# Patient Record
Sex: FEMALE | Race: WHITE | NOT HISPANIC OR LATINO | Employment: PART TIME | URBAN - NONMETROPOLITAN AREA
[De-identification: names, ages, dates, MRNs, and addresses within clinical notes are randomized per-mention and may not be internally consistent; named-entity substitution may affect disease eponyms.]

---

## 2017-01-06 ENCOUNTER — OFFICE VISIT (OUTPATIENT)
Dept: OBSTETRICS AND GYNECOLOGY | Facility: CLINIC | Age: 22
End: 2017-01-06

## 2017-01-06 VITALS
WEIGHT: 116 LBS | SYSTOLIC BLOOD PRESSURE: 98 MMHG | BODY MASS INDEX: 21.9 KG/M2 | DIASTOLIC BLOOD PRESSURE: 64 MMHG | HEIGHT: 61 IN

## 2017-01-06 DIAGNOSIS — N83.202 CYSTS OF BOTH OVARIES: Primary | ICD-10-CM

## 2017-01-06 DIAGNOSIS — N83.201 CYSTS OF BOTH OVARIES: Primary | ICD-10-CM

## 2017-01-06 PROCEDURE — 99212 OFFICE O/P EST SF 10 MIN: CPT | Performed by: NURSE PRACTITIONER

## 2017-01-06 RX ORDER — NICOTINE POLACRILEX 2 MG
1 GUM BUCCAL DAILY
COMMUNITY
End: 2019-10-08

## 2017-01-06 NOTE — PROGRESS NOTES
"Subjective   History of Present Illness    Alisa Coffey is a 21 y.o. female who presents for f/u after pelvic U/S for pelvic pain x 1 month that occurs about once a week. The pain is unilateral, sometimes on right, but also sometimes on left. Today reports no pain since last encounter. She still desires to have nexplanon insertion.      Current contraception: OCP (estrogen/progesterone)  Sexually active?: Yes  Postmenopausal?: No  HRT?: no  Hysterectomy?: no    Visit Vitals   • BP 98/64   • Ht 61\" (154.9 cm)   • Wt 116 lb (52.6 kg)   • LMP 2017   • Breastfeeding No   • BMI 21.92 kg/m2       Past Medical History   Diagnosis Date   • Abdominal pain    • Acute maxillary sinusitis, unspecified    • Allergic rhinitis due to pollen    • Biliary dyskinesia    • Chronic cholecystitis    • Common cold    • Contact dermatitis    • Contraception    • Diarrhea    • Encounter for administrative examinations    • Exanthematous disorder    • GERD (gastroesophageal reflux disease)    • Headache    • Influenza    • Keratoconjunctivitis    • Nausea and vomiting    • Need for immunization against influenza    • Other seasonal allergic rhinitis    • Pain in eye    • Purulent rhinitis    • Rash    • Right upper quadrant pain    • Urticaria        Past Surgical History   Procedure Laterality Date   • Cholecystectomy  10/01/2012     Cholecystectomy, laparoscopic (with intraoperative cholangiogram. Supervision & interpretation of intraoperative cholangiogram. Chronic cholecystitis. Biliary dyskinesia. Right upper quadrant pain)       The following portions of the patient's history were reviewed and updated as appropriate: allergies, current medications, past family history, past medical history, past social history, past surgical history and problem list.    Review of Systems    Constitutional:  No fatigue, no weight loss, no weight gain, no fever, no chills   Respiratory: No dyspnea, no cough, no hemoptysis, no " wheezing, no pleuritic pain   Cardiovascular: No chest pain, no palpitations, no arrhythmia, no orthopnea, no nocturnal dyspnea, no edema, no claudication   Breasts: No discharge from nipple, No breast tenderness and No breast mass   Gastrointestinal: No loss of appetite, No dysphagia, No abdominal pain, No nausea, No vomiting, No change in bowel habits, No diarrhea, No constipation and No blood in stool   Genitourinary: No increased frequency of urination, No dysuria, No hematuria, No nocturia, No urinary incontinence, No vaginal discharge, No abnormal vaginal bleeding, No pelvic pain, No menstrual problem and No menopausal problem   Skin: No skin rash, No skin lesion, No dry skin, No pruritus and No nail problem   Neurologic: No headache, No dizziness, No lightheadedness, No syncope, No vertigo, No weakness, No numbness, No tremor and No paresthesia   Psychiatric: No difficulty sleeping, No mood swings, No feeling anxious, No confusion and No memory loss          Objective   Physical Exam    General:  Alert and oriented x 3, Cooperative, Well developed & well nourished and No acute distress     FINDINGS- The uterus has a grossly normal appearance and appears  anteverted. There are no myometrial masses. Uterus measures 7 x 3.3 x  4.6[ cm. Endometrium appears striated and measures 10 mm in  thickness.       The right ovary has a normal sonographic appearance and measures 2.1  x 2.4 x 1.7 cm. Multiple follicles are visualized.      The left ovary has a normal sonographic appearance and measures 2.4 x  2.2 x 3.0 cm. Multiple follicles are visualized.      Color Doppler documents blood flow to both ovaries.      Small amount of pelvic fluid is present.      IMPRESSION- Normal pelvic ultrasound.    Assessment/Plan   Alisa was seen today for follow-up.    Diagnoses and all orders for this visit:    Cysts of both ovaries    All questions answered.  Discussed contraception methods, including risks/side  effects/benefits.  Contraception: OCP (estrogen/progesterone)  Diagnosis explained in detail, including differential.  Discussed pelvic pain. Went over GYN causes such as PID, ovarian torsion, ruptured ovarian cysts, ectopic pregnancy, endometriosis, adenomyosis, and uterine fibroids. Advised patient to use NSAIDs and heating pads for symptomatic treatment.  Follow up in 1 week. Nexplanon insertion next week

## 2017-01-06 NOTE — MR AVS SNAPSHOT
"                        Alisa Coffey   1/6/2017 10:00 AM   Office Visit    Dept Phone:  [phone]   Encounter #:  [id number]    Provider:  BENNY Corral   Department:  [facility]                Your Full Care Plan              Your Updated Medication List          This list is accurate as of: 1/6/17 10:31 AM.  Always use your most recent med list.                Biotin 1 MG capsule       omeprazole 20 MG capsule   Commonly known as:  PRILOSEC   Take 1 capsule by mouth Daily.       TRI-SPRINTEC 0.18/0.215/0.25 MG-35 MCG per tablet   Generic drug:  norgestimate-ethinyl estradiol               Instructions     None    Patient Instructions History      Upcoming Appointments     Visit Type Date Time Department    OFFICE VISIT 1/6/2017 10:00 AM CAREY GON[facility]    IN OFFICE PROCEDURE 1/13/2017  9:15 AM W ALTHEA GONZALEZ     1/13/2017  9:15 AM [facility] LISA      MyChart Signup     Our records indicate that you have declined [facility] sign[facility]. If you would like to sign up for [employer], please email [email or web address] or call [phone] to obtain an activation code.             Other Info from Your Visit           Your Appointments     Jan 13, 2017  9:15 AM CST   IN OFFICE PROCEDURE with BENNY Corral, PROCEDURE ROOM OBGYN [facility]   [facility] (--)    [address] [place] [address] Dr  [facility] [address] [place] [id number] [phone]           Bring medication list, test results, and radiology films that apply.              Allergies     No Known Allergies      Reason for Visit     Follow-up f/u to discuss U/S      Vital Signs     Blood Pressure Height Weight Last Menstrual Period Breastfeeding? Body Mass Index    98/64 61\" (154.9 cm) 116 lb (52.6 kg) 01/04/2017 No 21.92 kg/m2    Smoking Status                   Never Smoker             "

## 2017-01-13 ENCOUNTER — PROCEDURE VISIT (OUTPATIENT)
Dept: OBSTETRICS AND GYNECOLOGY | Facility: CLINIC | Age: 22
End: 2017-01-13

## 2017-01-13 DIAGNOSIS — Z30.46 SURVEILLANCE OF IMPLANTABLE SUBDERMAL CONTRACEPTIVE: Primary | ICD-10-CM

## 2017-01-13 PROCEDURE — 11981 INSERTION DRUG DLVR IMPLANT: CPT | Performed by: NURSE PRACTITIONER

## 2017-01-13 NOTE — PROGRESS NOTES
Nexplanon Insertion    Patient's last menstrual period was 01/04/2017.    Date of procedure:  1/13/2017    Risks and benefits discussed?  yes  All questions answered?  yes  Consents given by  the patient  Written consent obtained?  yes    Local anesthesia used:  yes - 2 cc's of  Meds; anesthesia local: 1% lidocaine    Procedure documentation:    The upper left arm (non-dominant) was marked at the intended site of insertion. The skin was cleansed with an antiseptic solution.  Local anesthesia was injected.  The Nexplanon was placed subdermally without difficulty.  The devise was able to be palpated in the arm by both myself and Alisa.  Steri-strips were then placed across the site of insertion and the arm was wrapped.    She tolerated the procedure well.  There were no complications.  EBL was minimal.    NDC# 0133-2421-45    Post procedure instructions: Remove the wrapping in 24 hours and the steri-strips in 5 days.    Follow up needed: PRN    Assessment/Plan:  Diagnoses and all orders for this visit:    Surveillance of implantable subdermal contraceptive      This note was electronically signed.    Renzo Bernardo, APRN  January 13, 2017

## 2017-01-13 NOTE — MR AVS SNAPSHOT
Alisa Coffey   1/13/2017 9:15 AM   Procedure visit    Dept Phone:  [phone]   Encounter #:  [id number]    Provider:  BENNY Corral; PROCEDURE ROOM OBMANPREET GONZALEZ   Department:  [facility]                Your Full Care Plan              Your Updated Medication List          This list is accurate as of: 1/13/17  9:48 AM.  Always use your most recent med list.                Biotin 1 MG capsule       omeprazole 20 MG capsule   Commonly known as:  PRILOSEC   Take 1 capsule by mouth Daily.       TRI-SPRINTEC 0.18/0.215/0.25 MG-35 MCG per tablet   Generic drug:  norgestimate-ethinyl estradiol               You Were Diagnosed With        Codes Comments    Surveillance of implantable subdermal contraceptive    -  Primary ICD-10-CM: Z30.46  ICD-9-CM: V25.43       Instructions     None    Patient Instructions History      Upcoming Appointments     Visit Type Date Time Department    IN OFFICE PROCEDURE 1/13/2017  9:15 AM [facility]     1/13/2017  9:15 AM [facility]    PAP SMEAR/PELVIC EXAM 7/20/2017  9:00 AM [facility]      [employer] Signup     Our records indicate that you have declined [facility] [employer]t signup. If you would like to sign up for [employer], please email [email or web address] or call [phone] to obtain an activation code.             Other Info from Your Visit           Your Appointments     Jul 20, 2017  9:00 AM CDT   Pap Smear/Pelvic Exam with BENNY Corral   [facility] (--)    [address] [place] [address] Dr  [facility] [address] [place] [id number] [phone]              Allergies     No Known Allergies      Vital Signs     Last Menstrual Period Smoking Status                01/04/2017 Never Smoker          Problems and Diagnoses Noted     Encounter for surveillance of implantable subdermal contraceptive    -  Primary

## 2017-01-17 ENCOUNTER — HOSPITAL ENCOUNTER (OUTPATIENT)
Dept: URGENT CARE | Facility: CLINIC | Age: 22
Discharge: HOME | End: 2017-01-17
Attending: FAMILY MEDICINE | Admitting: FAMILY MEDICINE

## 2017-12-14 RX ORDER — OSELTAMIVIR PHOSPHATE 75 MG/1
75 CAPSULE ORAL DAILY
Qty: 10 CAPSULE | Refills: 0 | Status: SHIPPED | OUTPATIENT
Start: 2017-12-14 | End: 2018-04-13

## 2018-03-31 ENCOUNTER — APPOINTMENT (OUTPATIENT)
Dept: GENERAL RADIOLOGY | Facility: HOSPITAL | Age: 23
End: 2018-03-31

## 2018-03-31 ENCOUNTER — HOSPITAL ENCOUNTER (EMERGENCY)
Facility: HOSPITAL | Age: 23
Discharge: HOME | End: 2018-03-31
Attending: EMERGENCY MEDICINE | Admitting: EMERGENCY MEDICINE

## 2018-03-31 VITALS
TEMPERATURE: 98.2 F | WEIGHT: 121.1 LBS | SYSTOLIC BLOOD PRESSURE: 120 MMHG | DIASTOLIC BLOOD PRESSURE: 75 MMHG | RESPIRATION RATE: 18 BRPM | OXYGEN SATURATION: 99 % | HEART RATE: 68 BPM | HEIGHT: 62 IN | BODY MASS INDEX: 22.28 KG/M2

## 2018-03-31 DIAGNOSIS — R00.2 PALPITATIONS: ICD-10-CM

## 2018-03-31 DIAGNOSIS — F41.9 ANXIETY: Primary | ICD-10-CM

## 2018-03-31 LAB
ALBUMIN SERPL-MCNC: 4.6 G/DL (ref 3.4–4.8)
ALBUMIN/GLOB SERPL: 1.3 G/DL (ref 1.1–1.8)
ALP SERPL-CCNC: 71 U/L (ref 38–126)
ALT SERPL W P-5'-P-CCNC: 22 U/L (ref 9–52)
ANION GAP SERPL CALCULATED.3IONS-SCNC: 14 MMOL/L (ref 5–15)
AST SERPL-CCNC: 25 U/L (ref 14–36)
B-HCG UR QL: NEGATIVE
BASOPHILS # BLD AUTO: 0.03 10*3/MM3 (ref 0–0.2)
BASOPHILS NFR BLD AUTO: 0.4 % (ref 0–2)
BILIRUB SERPL-MCNC: 0.6 MG/DL (ref 0.2–1.3)
BILIRUB UR QL STRIP: NEGATIVE
BUN BLD-MCNC: 8 MG/DL (ref 7–21)
BUN/CREAT SERPL: 12.3 (ref 7–25)
CALCIUM SPEC-SCNC: 9.7 MG/DL (ref 8.4–10.2)
CHLORIDE SERPL-SCNC: 102 MMOL/L (ref 95–110)
CLARITY UR: CLEAR
CO2 SERPL-SCNC: 23 MMOL/L (ref 22–31)
COLOR UR: YELLOW
CREAT BLD-MCNC: 0.65 MG/DL (ref 0.5–1)
D-DIMER, QUANTITATIVE (MAD,POW, STR): <270 NG/ML (FEU) (ref 0–470)
DEPRECATED RDW RBC AUTO: 40.8 FL (ref 36.4–46.3)
EOSINOPHIL # BLD AUTO: 0.09 10*3/MM3 (ref 0–0.7)
EOSINOPHIL NFR BLD AUTO: 1.1 % (ref 0–7)
ERYTHROCYTE [DISTWIDTH] IN BLOOD BY AUTOMATED COUNT: 11.8 % (ref 11.5–14.5)
GFR SERPL CREATININE-BSD FRML MDRD: 115 ML/MIN/1.73 (ref 71–165)
GLOBULIN UR ELPH-MCNC: 3.5 GM/DL (ref 2.3–3.5)
GLUCOSE BLD-MCNC: 99 MG/DL (ref 60–100)
GLUCOSE UR STRIP-MCNC: NEGATIVE MG/DL
HCT VFR BLD AUTO: 40.9 % (ref 35–45)
HGB BLD-MCNC: 13.9 G/DL (ref 12–15.5)
HGB UR QL STRIP.AUTO: NEGATIVE
HOLD SPECIMEN: NORMAL
IMM GRANULOCYTES # BLD: 0.02 10*3/MM3 (ref 0–0.02)
IMM GRANULOCYTES NFR BLD: 0.2 % (ref 0–0.5)
KETONES UR QL STRIP: NEGATIVE
LEUKOCYTE ESTERASE UR QL STRIP.AUTO: NEGATIVE
LYMPHOCYTES # BLD AUTO: 1.72 10*3/MM3 (ref 0.6–4.2)
LYMPHOCYTES NFR BLD AUTO: 20.8 % (ref 10–50)
MCH RBC QN AUTO: 32.3 PG (ref 26.5–34)
MCHC RBC AUTO-ENTMCNC: 34 G/DL (ref 31.4–36)
MCV RBC AUTO: 94.9 FL (ref 80–98)
MONOCYTES # BLD AUTO: 0.44 10*3/MM3 (ref 0–0.9)
MONOCYTES NFR BLD AUTO: 5.3 % (ref 0–12)
NEUTROPHILS # BLD AUTO: 5.97 10*3/MM3 (ref 2–8.6)
NEUTROPHILS NFR BLD AUTO: 72.2 % (ref 37–80)
NITRITE UR QL STRIP: NEGATIVE
PH UR STRIP.AUTO: 7.5 [PH] (ref 5–9)
PLATELET # BLD AUTO: 269 10*3/MM3 (ref 150–450)
PMV BLD AUTO: 10.4 FL (ref 8–12)
POTASSIUM BLD-SCNC: 3.7 MMOL/L (ref 3.5–5.1)
PROT SERPL-MCNC: 8.1 G/DL (ref 6.3–8.6)
PROT UR QL STRIP: NEGATIVE
RBC # BLD AUTO: 4.31 10*6/MM3 (ref 3.77–5.16)
SODIUM BLD-SCNC: 139 MMOL/L (ref 137–145)
SP GR UR STRIP: 1 (ref 1–1.03)
TROPONIN I SERPL-MCNC: <0.012 NG/ML
TSH SERPL DL<=0.05 MIU/L-ACNC: 2.45 MIU/ML (ref 0.46–4.68)
UROBILINOGEN UR QL STRIP: NORMAL
WBC NRBC COR # BLD: 8.27 10*3/MM3 (ref 3.2–9.8)

## 2018-03-31 PROCEDURE — 81003 URINALYSIS AUTO W/O SCOPE: CPT | Performed by: PHYSICIAN ASSISTANT

## 2018-03-31 PROCEDURE — 99283 EMERGENCY DEPT VISIT LOW MDM: CPT

## 2018-03-31 PROCEDURE — 85379 FIBRIN DEGRADATION QUANT: CPT | Performed by: PHYSICIAN ASSISTANT

## 2018-03-31 PROCEDURE — 93005 ELECTROCARDIOGRAM TRACING: CPT | Performed by: PHYSICIAN ASSISTANT

## 2018-03-31 PROCEDURE — 71046 X-RAY EXAM CHEST 2 VIEWS: CPT

## 2018-03-31 PROCEDURE — 93010 ELECTROCARDIOGRAM REPORT: CPT | Performed by: INTERNAL MEDICINE

## 2018-03-31 PROCEDURE — 84484 ASSAY OF TROPONIN QUANT: CPT | Performed by: PHYSICIAN ASSISTANT

## 2018-03-31 PROCEDURE — 25010000002 ONDANSETRON PER 1 MG: Performed by: PHYSICIAN ASSISTANT

## 2018-03-31 PROCEDURE — 85025 COMPLETE CBC W/AUTO DIFF WBC: CPT | Performed by: PHYSICIAN ASSISTANT

## 2018-03-31 PROCEDURE — 81025 URINE PREGNANCY TEST: CPT | Performed by: PHYSICIAN ASSISTANT

## 2018-03-31 PROCEDURE — 96374 THER/PROPH/DIAG INJ IV PUSH: CPT

## 2018-03-31 PROCEDURE — 84443 ASSAY THYROID STIM HORMONE: CPT | Performed by: PHYSICIAN ASSISTANT

## 2018-03-31 PROCEDURE — 80053 COMPREHEN METABOLIC PANEL: CPT | Performed by: PHYSICIAN ASSISTANT

## 2018-03-31 RX ORDER — SODIUM CHLORIDE 0.9 % (FLUSH) 0.9 %
10 SYRINGE (ML) INJECTION AS NEEDED
Status: DISCONTINUED | OUTPATIENT
Start: 2018-03-31 | End: 2018-03-31 | Stop reason: HOSPADM

## 2018-03-31 RX ORDER — ONDANSETRON 2 MG/ML
4 INJECTION INTRAMUSCULAR; INTRAVENOUS ONCE
Status: COMPLETED | OUTPATIENT
Start: 2018-03-31 | End: 2018-03-31

## 2018-03-31 RX ORDER — ONDANSETRON 4 MG/1
4 TABLET, ORALLY DISINTEGRATING ORAL EVERY 6 HOURS PRN
Qty: 40 TABLET | Refills: 0 | Status: SHIPPED | OUTPATIENT
Start: 2018-03-31 | End: 2018-04-10

## 2018-03-31 RX ORDER — BUSPIRONE HYDROCHLORIDE 5 MG/1
10 TABLET ORAL 3 TIMES DAILY
Qty: 180 TABLET | Refills: 0 | Status: SHIPPED | OUTPATIENT
Start: 2018-03-31 | End: 2018-04-30

## 2018-03-31 RX ADMIN — ONDANSETRON 4 MG: 2 INJECTION INTRAMUSCULAR; INTRAVENOUS at 12:29

## 2018-04-02 ENCOUNTER — OUTSIDE FACILITY SERVICE (OUTPATIENT)
Dept: CARDIOLOGY | Facility: CLINIC | Age: 23
End: 2018-04-02

## 2018-04-02 PROCEDURE — 93018 CV STRESS TEST I&R ONLY: CPT | Performed by: INTERNAL MEDICINE

## 2018-04-02 PROCEDURE — 93350 STRESS TTE ONLY: CPT | Performed by: INTERNAL MEDICINE

## 2018-04-13 ENCOUNTER — OFFICE VISIT (OUTPATIENT)
Dept: CARDIOLOGY | Facility: CLINIC | Age: 23
End: 2018-04-13

## 2018-04-13 VITALS
BODY MASS INDEX: 23.19 KG/M2 | HEART RATE: 74 BPM | HEIGHT: 62 IN | SYSTOLIC BLOOD PRESSURE: 110 MMHG | DIASTOLIC BLOOD PRESSURE: 70 MMHG | WEIGHT: 126 LBS | OXYGEN SATURATION: 99 %

## 2018-04-13 DIAGNOSIS — F43.9 STRESS: ICD-10-CM

## 2018-04-13 DIAGNOSIS — R00.2 PALPITATION: ICD-10-CM

## 2018-04-13 DIAGNOSIS — I47.1 SUPRAVENTRICULAR TACHYCARDIA (HCC): Primary | ICD-10-CM

## 2018-04-13 PROCEDURE — 99243 OFF/OP CNSLTJ NEW/EST LOW 30: CPT | Performed by: NURSE PRACTITIONER

## 2018-04-13 NOTE — PATIENT INSTRUCTIONS
Supraventricular Tachycardia, Adult  Supraventricular tachycardia (SVT) is a type of abnormal heart rhythm. It causes the heart to beat very quickly and then return to a normal speed.  A normal heart rate is  beats per minute. During an episode of SVT, your heart rate may be higher than 150 beats per minute. Episodes of SVT can be frightening, but they are usually not dangerous. However, if episodes happens often or last for long periods of time, they may lead to heart failure.  What are the causes?  Usually, a normal heartbeat starts when an area called the sinoatrial node releases an electrical signal. In SVT, other areas of the heart send out electrical signals that interfere with the signal from the sinoatrial node.  It is not known why some people get SVT and others do not.  What increases the risk?  This condition is more likely to develop in:  · People who are 12?30 years old.  · Women.  Factors that may increase your chances of an attack include:  · Stress.  · Tiredness.  · Smoking.  · Stimulant drugs, such as cocaine and methamphetamine.  · Alcohol.  · Caffeine.  · Pregnancy.  · Anxiety.  What are the signs or symptoms?  Symptoms of this condition include:  · A pounding heart.  · A feeling that the heart is skipping beats (palpitations).  · Weakness.  · Shortness of breath.  · Tightness or pain in your chest.  · Light-headedness.  · Anxiety.  · Dizziness.  · Sweating.  · Nausea.  · Fainting.  · Fatigue or tiredness.  A mild episode may not cause symptoms.  How is this diagnosed?  This condition may be diagnosed based on:  · Your symptoms.  · A physical exam. If you are have an episode of SVT during the exam, the health care provider may be able to diagnose SVT by listening to your heart and feeling your pulse.  · Tests. These may include:  ¨ An electrocardiogram (ECG). This test is done to check for problems with electrical activity in the heart.  ¨ A Holter monitor or event monitor test. This test  involves wearing a portable device that monitors your heart rate over time.  ¨ An echocardiogram. This test involves taking an image of your heart using sound waves. It is done to rule out other causes of a fast heart rate.  ¨ Blood tests.  How is this treated?  This condition may be treated with:  · Vagal nerve stimulation. The treatment involves stimulating your vagus nerve, which slows down the heart. It is often the first and only treatment that is needed for this condition. It is a good idea to try the several ways of doing vagal stimulation to find which one works best for you. Ways to do this treatment include:  ¨ Holding your breath and pushing, as though you are having a bowel movement.  ¨ Massaging an area on one side of your neck, below your jaw. Do not try this yourself. Only a health care provider should do this. If done the wrong way, it can lead to a stroke.  ¨ Bending forward with your head between your legs.  ¨ Coughing while bending forward with your head between your legs.  ¨ Closing your eyes and massaging your eyeballs. A health care provider should guide you through this method before you try it on your own.  · Medicines that prevent attacks.  · Medicine to stop an attack. The medicine is given through an IV tube at the hospital.  · A small electric shock (cardioversion) that stops an attack. Before you get the shock, you will get medicine to make you fall asleep.  · Radiofrequency ablation. In this procedure, a small, thin tube (catheter) is used to send radiofrequency energy to the area of tissue that is causing the rapid heartbeats. The energy kills the cells and helps your heart keep a normal rhythm. You may have this treatment if you have symptoms of SVT often.  If you do not have symptoms, you may not need treatment.  Follow these instructions at home:  Stress   · Avoid stressful situations when possible.  · Find healthy ways of managing stress that work for you. Some healthy ways to  manage stress include:  ¨ Taking part in relaxing activities, such as yoga, meditation, or being out in nature.  ¨ Listening to relaxing music.  ¨ Practicing relaxation techniques, such as deep breathing.  ¨ Leading a healthy lifestyle. This involves getting plenty of sleep, exercising, and eating a balanced diet.  ¨ Attending counseling or talk therapy with a mental health professional.  Sleep   · Try to get at least 7 hours of sleep each night.  Tobacco and nicotine   · Do not use any products that contain nicotine or tobacco, such as cigarettes and e-cigarettes. If you need help quitting, ask your health care provider.  Alcohol   · If alcohol triggers episodes of SVT, do not drink alcohol.  · If alcohol does not seem to trigger episodes, limit alcohol intake to no more than 1 drink a day for nonpregnant women and 2 drinks a day for men. One drink equals 12 oz of beer, 5 oz of wine, or 1½ oz of hard liquor.  Caffeine   · If caffeine triggers episodes of SVT, do not eat, drink, or use anything with caffeine in it.  · If caffeine does not seem to trigger episodes, consume caffeine in moderation.  Stimulant drugs   · Do not use stimulant drugs. If you need help quitting, talk with your health care provider.  General instructions   · Maintain a healthy weight.  · Exercise regularly. Ask your health care provider to suggest some good activities for you. Aim for one or a combination of the following:  ¨ 150 minutes per week of moderate exercise, such as walking or yoga.  ¨ 75 minutes per week of vigorous exercise, such as running or swimming.  · Perform vagus nerve stimulation as directed by your health care provider.  · Take over-the-counter and prescription medicines only as told by your health care provider.  Contact a health care provider if:  · You have episodes of SVT more often than before.  · Episodes of SVT last longer than before.  · Vagus nerve stimulation is no longer helping.  · You have new symptoms.  Get  help right away if:  · You have chest pain.  · Your symptoms get worse.  · You have trouble breathing.  · You have an episode of SVT that lasts longer than 20 minutes.  · You faint.  These symptoms may represent a serious problem that is an emergency. Do not wait to see if the symptoms will go away. Get medical help right away. Call your local emergency services (911 in the [place] Do not drive yourself to the hospital.  This information is not intended to replace advice given to you by your health care provider. Make sure you discuss any questions you have with your health care provider.  Document Released: 12/18/2006 Document Revised: 08/24/2017 Document Reviewed: 08/24/2017  Elsevier Interactive Patient Education © 2017 [employer]

## 2018-04-13 NOTE — ASSESSMENT & PLAN NOTE
Encouraged use of guided imagery or other relaxation techniques and routine aerobic exercise to help her deal with stress of college, etc.

## 2018-04-13 NOTE — PROGRESS NOTES
Subjective:     Encounter Date:04/13/2018    Chief Complaint:    Patient ID: Alisa Coffey is a 22 y.o. female here today for cardiac evaluation of palpitations at the request of Dr. Park. 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 Her usual PCP is Dr. Golden Anthony.  Went to ER in [place] with palpitations and SOA a few weeks ago.     Palpitations    This is a new problem. The current episode started more than 1 month ago. The problem occurs intermittently. The problem has been unchanged. Nothing aggravates the symptoms. Associated symptoms include an irregular heartbeat and malaise/fatigue. Pertinent negatives include no anxiety, chest pain, coughing, dizziness, fever, nausea, shortness of breath or weakness. She has tried anxiolytics for the symptoms. The treatment provided no relief. Risk factors include stress. There is no history of anemia or hyperthyroidism.       HPI     Palpitations    Additional comments: heart races out of the blue at rest and with activity, makes her feel dizzy, happens several times a week, started several months ago       Last edited by BENNY Bean on 4/13/2018 11:35 AM. (History)        History:   Past Medical History:   Diagnosis Date   • Abdominal pain    • Acute maxillary sinusitis, unspecified    • Allergic rhinitis due to pollen    • Biliary dyskinesia    • Chronic cholecystitis    • Common cold    • Contact dermatitis    • Contraception    • Diarrhea    • Encounter for administrative examinations    • Exanthematous disorder    • GERD (gastroesophageal reflux disease)    • Headache    • Influenza    • Keratoconjunctivitis    • Nausea and vomiting    • Need for immunization against influenza    • Other seasonal allergic rhinitis    • Pain in eye    • Palpitation    • Purulent rhinitis    • Rash    • Right upper quadrant pain    • Urticaria      Past Surgical History:   Procedure Laterality Date   • CHOLECYSTECTOMY  10/01/2012     Cholecystectomy, laparoscopic (with intraoperative cholangiogram. Supervision & interpretation of intraoperative cholangiogram. Chronic cholecystitis. Biliary dyskinesia. Right upper quadrant pain)     Social History     Social History   • Marital status: Single     Spouse name: N/A   • Number of children: N/A   • Years of education: N/A     Occupational History   • Not on file.     Social History Main Topics   • Smoking status: Passive Smoke Exposure - Never Smoker   • Smokeless tobacco: Never Used   • Alcohol use Yes      Comment: rare and small amount   • Drug use: No   • Sexual activity: Yes     Birth control/ protection: Pill     Other Topics Concern   • Not on file     Social History Narrative   • No narrative on file     Family History   Problem Relation Age of Onset   • Rheum arthritis Mother    • Anxiety disorder Mother    • No Known Problems Father    • Anxiety disorder Maternal Grandmother    • Depression Maternal Grandmother    • COPD Maternal Grandfather    • No Known Problems Paternal Grandmother    • No Known Problems Paternal Grandfather    • Diabetes type II Other        Outpatient Prescriptions Marked as Taking for the 4/13/18 encounter (Office Visit) with BENNY Bean   Medication Sig Dispense Refill   • Biotin 1 MG capsule Take 1 capsule by mouth Daily.     • busPIRone (BUSPAR) 5 MG tablet Take 2 tablets by mouth 3 (Three) Times a Day for 30 days. (Patient taking differently: Take 10 mg by mouth 3 (Three) Times a Day As Needed.) 180 tablet 0   • loratadine (CLARITIN) 10 MG tablet Take 1 tablet by mouth Daily. 30 tablet 0   • norgestimate-ethinyl estradiol (TRI-SPRINTEC) 0.18/0.215/0.25 MG-35 MCG per tablet Take 1 tablet by mouth Daily.     • omeprazole (PRILOSEC) 20 MG capsule Take 1 capsule by mouth Daily. 90 capsule 3       Review of Systems:  Review of Systems   Constitution: Positive for malaise/fatigue. Negative for chills, decreased appetite, fever and weakness.   HENT:  "Positive for congestion. Negative for nosebleeds.    Eyes: Positive for blurred vision (wears glasses). Negative for double vision.   Cardiovascular: Positive for irregular heartbeat and palpitations. Negative for chest pain and leg swelling.   Respiratory: Negative for cough and shortness of breath.    Endocrine: Negative for cold intolerance and heat intolerance.   Hematologic/Lymphatic: Bruises/bleeds easily.   Skin: Positive for rash. Negative for dry skin and itching.   Musculoskeletal: Positive for back pain. Negative for joint pain, muscle cramps and neck pain.   Gastrointestinal: Positive for diarrhea and heartburn. Negative for abdominal pain, constipation and nausea.   Genitourinary: Negative for dysuria, frequency and hematuria.   Neurological: Positive for headaches. Negative for dizziness, light-headedness and loss of balance.   Psychiatric/Behavioral: Negative for depression. The patient has insomnia. The patient is not nervous/anxious.             Objective:   /70 (BP Location: Left arm, Patient Position: Sitting, Cuff Size: Adult)   Pulse 74   Ht 157.5 cm (62\")   Wt 57.2 kg (126 lb)   SpO2 99%   BMI 23.05 kg/m²   Wt Readings from Last 3 Encounters:   04/13/18 57.2 kg (126 lb)   03/31/18 54.9 kg (121 lb 1.6 oz)   06/14/17 50.2 kg (110 lb 9.6 oz)         Physical Exam   Constitutional: She is oriented to person, place, and time. She appears well-developed and well-nourished.   HENT:   Head: Normocephalic and atraumatic.   Right Ear: External ear normal.   Left Ear: External ear normal.   Nose: Nose normal.   Mouth/Throat: Oropharynx is clear and moist.   Eyes: Conjunctivae and EOM are normal. Pupils are equal, round, and reactive to light. Right eye exhibits no discharge. Left eye exhibits no discharge. No scleral icterus.   Neck: Normal range of motion. Neck supple. No JVD present. No tracheal deviation present. No thyromegaly present.   Cardiovascular: Normal rate and regular rhythm.  Exam " reveals no gallop and no friction rub.    No murmur heard.  Pulmonary/Chest: Effort normal and breath sounds normal. She has no wheezes. She has no rales.   Abdominal: Soft. Bowel sounds are normal. She exhibits no mass. There is no tenderness. There is no rebound and no guarding.   Musculoskeletal: She exhibits no edema, tenderness or deformity.   Lymphadenopathy:     She has no cervical adenopathy.   Neurological: She is alert and oriented to person, place, and time. No cranial nerve deficit.   Skin: Skin is warm and dry.   Psychiatric: She has a normal mood and affect. Her behavior is normal. Judgment and thought content normal.   Vitals reviewed.      Lab/Diagnostics Review:   04/02/2018 stress echocardiogram low risk of ischemia  04/02/2008.  Echocardiogram EF 65%.  No significant valvular heart disease    Lab Results   Component Value Date    WBC 8.27 03/31/2018    HGB 13.9 03/31/2018    HCT 40.9 03/31/2018    MCV 94.9 03/31/2018     03/31/2018     Lab Results   Component Value Date    GLUCOSE 99 03/31/2018    BUN 8 03/31/2018    CREATININE 0.65 03/31/2018    EGFRIFNONA 115 03/31/2018    BCR 12.3 03/31/2018    K 3.7 03/31/2018    CO2 23.0 03/31/2018    CALCIUM 9.7 03/31/2018    ALBUMIN 4.60 03/31/2018    LABIL2 1.3 03/31/2018    AST 25 03/31/2018    ALT 22 03/31/2018     Lab Results   Component Value Date    TSH 2.450 03/31/2018     3/31/2018 EKG sinus 68 bpm    03/15/2018 Holter monitor average heart rate 89 bpm, heart rates greater than 120 bpm noted.  14% of the time, no bradycardia noted.  No pauses exceeding 2 seconds noted 236 ventricular ectopic beats representing less than 1% of the total beat count noted.  One episode of sinus tachycardia or supraventricular tachycardia in the 160s but without correlating symptoms. Patient symptoms were associated with PVCs (per my review) performed at [facility] and no symptom diary available.     02/13/2018  CBC WBC 6500, hemoglobin 14.5, hematocrit 44.3%,  platelets 223,000  BMP sodium 141, potassium 3.9, chloride 102, CO2 25, BUN 7, creatinine 0.6, calcium 9.6 , glucose 91  TSH 1.285, free T4 1.12    Procedures: none in office today        Assessment/Plan:         Problem List Items Addressed This Visit        Cardiovascular and Mediastinum    Palpitation    Supraventricular tachycardia - Primary    Current Assessment & Plan     Will check 14 day event monitor. May need to refer to EP for ablation. Discussed importance of hydration and avoiding caffeine, decongestants, and any stimulants.          Relevant Orders    Holter Monitor - 72 Hour Up To 21 Days       Other    Stress    Current Assessment & Plan     Encouraged use of guided imagery or other relaxation techniques and routine aerobic exercise to help her deal with stress of college, etc.           Other Visit Diagnoses    None.         Return in about 2 months (around 6/13/2018) for Recheck.           BENNY Ortiz, ACNP-BC, CHFN-BC

## 2018-04-13 NOTE — ASSESSMENT & PLAN NOTE
Will check 14 day event monitor. May need to refer to EP for ablation. Discussed importance of hydration and avoiding caffeine, decongestants, and any stimulants.

## 2018-05-04 ENCOUNTER — TELEPHONE (OUTPATIENT)
Dept: CARDIOLOGY | Facility: CLINIC | Age: 23
End: 2018-05-04

## 2018-05-04 NOTE — TELEPHONE ENCOUNTER
----- Message from BENNY Bean sent at 5/4/2018  1:18 PM CDT -----  Please notify pt of no significant findings on heart monitor and her symptoms were associated with normal sinus rhythm. Continue relaxation exercises. Send copy of results to her PCP and referring. TX!

## 2018-10-01 ENCOUNTER — APPOINTMENT (OUTPATIENT)
Dept: GENERAL RADIOLOGY | Facility: HOSPITAL | Age: 23
End: 2018-10-01

## 2018-10-01 ENCOUNTER — HOSPITAL ENCOUNTER (EMERGENCY)
Facility: HOSPITAL | Age: 23
Discharge: HOME | End: 2018-10-01
Attending: FAMILY MEDICINE | Admitting: FAMILY MEDICINE

## 2018-10-01 VITALS
HEIGHT: 62 IN | SYSTOLIC BLOOD PRESSURE: 119 MMHG | WEIGHT: 126 LBS | OXYGEN SATURATION: 98 % | TEMPERATURE: 98.4 F | RESPIRATION RATE: 18 BRPM | BODY MASS INDEX: 23.19 KG/M2 | HEART RATE: 88 BPM | DIASTOLIC BLOOD PRESSURE: 61 MMHG

## 2018-10-01 DIAGNOSIS — J06.9 UPPER RESPIRATORY TRACT INFECTION, UNSPECIFIED TYPE: Primary | ICD-10-CM

## 2018-10-01 LAB — B-HCG UR QL: NEGATIVE

## 2018-10-01 PROCEDURE — 94760 N-INVAS EAR/PLS OXIMETRY 1: CPT

## 2018-10-01 PROCEDURE — 99284 EMERGENCY DEPT VISIT MOD MDM: CPT

## 2018-10-01 PROCEDURE — 71046 X-RAY EXAM CHEST 2 VIEWS: CPT

## 2018-10-01 PROCEDURE — 81025 URINE PREGNANCY TEST: CPT | Performed by: FAMILY MEDICINE

## 2018-10-01 PROCEDURE — 94640 AIRWAY INHALATION TREATMENT: CPT

## 2018-10-01 PROCEDURE — 63710000001 PREDNISONE PER 1 MG: Performed by: FAMILY MEDICINE

## 2018-10-01 PROCEDURE — 94799 UNLISTED PULMONARY SVC/PX: CPT

## 2018-10-01 RX ORDER — METHYLPREDNISOLONE 4 MG/1
TABLET ORAL
Qty: 21 TABLET | Refills: 0 | Status: SHIPPED | OUTPATIENT
Start: 2018-10-01 | End: 2019-03-18

## 2018-10-01 RX ORDER — PREDNISONE 20 MG/1
40 TABLET ORAL ONCE
Status: COMPLETED | OUTPATIENT
Start: 2018-10-01 | End: 2018-10-01

## 2018-10-01 RX ORDER — BUSPIRONE HYDROCHLORIDE 10 MG/1
10 TABLET ORAL 3 TIMES DAILY
COMMUNITY
End: 2019-03-18

## 2018-10-01 RX ORDER — BROMPHENIRAMINE MALEATE, PSEUDOEPHEDRINE HYDROCHLORIDE, AND DEXTROMETHORPHAN HYDROBROMIDE 2; 30; 10 MG/5ML; MG/5ML; MG/5ML
5 SYRUP ORAL
COMMUNITY
Start: 2018-09-21 | End: 2018-10-12

## 2018-10-01 RX ORDER — IPRATROPIUM BROMIDE AND ALBUTEROL SULFATE 2.5; .5 MG/3ML; MG/3ML
3 SOLUTION RESPIRATORY (INHALATION) ONCE
Status: COMPLETED | OUTPATIENT
Start: 2018-10-01 | End: 2018-10-01

## 2018-10-01 RX ORDER — ALBUTEROL SULFATE 90 UG/1
2 AEROSOL, METERED RESPIRATORY (INHALATION) EVERY 4 HOURS PRN
Qty: 1 INHALER | Refills: 0 | Status: SHIPPED | OUTPATIENT
Start: 2018-10-01 | End: 2019-03-18 | Stop reason: SDUPTHER

## 2018-10-01 RX ORDER — ALBUTEROL SULFATE 2.5 MG/3ML
2.5 SOLUTION RESPIRATORY (INHALATION) EVERY 4 HOURS PRN
Qty: 50 VIAL | Refills: 0 | Status: SHIPPED | OUTPATIENT
Start: 2018-10-01 | End: 2020-02-10

## 2018-10-01 RX ORDER — DOXYCYCLINE 100 MG/1
100 CAPSULE ORAL 2 TIMES DAILY
Qty: 20 CAPSULE | Refills: 0 | Status: SHIPPED | OUTPATIENT
Start: 2018-10-01 | End: 2019-03-18

## 2018-10-01 RX ADMIN — PREDNISONE 40 MG: 20 TABLET ORAL at 08:27

## 2018-10-01 RX ADMIN — IPRATROPIUM BROMIDE AND ALBUTEROL SULFATE 3 ML: 2.5; .5 SOLUTION RESPIRATORY (INHALATION) at 08:12

## 2018-10-01 NOTE — ED PROVIDER NOTES
Subjective   URI symptoms for the past 2 weeks. Pt seen at [facility] last week and was placed on abx (z-chiara),        Cough   Cough characteristics:  Productive  Sputum characteristics:  Nondescript  Severity:  Moderate  Onset quality:  Gradual  Duration:  2 weeks  Timing:  Constant  Progression:  Worsening  Chronicity:  New  Smoker: no    Context: upper respiratory infection    Relieved by:  Nothing  Worsened by:  Nothing  Ineffective treatments:  None tried  Associated symptoms: diaphoresis, rhinorrhea, shortness of breath, sinus congestion, sore throat and wheezing    Associated symptoms: no chest pain, no chills, no ear fullness, no ear pain, no eye discharge, no fever, no headaches, no myalgias and no rash    Risk factors: no chemical exposure        Review of Systems   Constitutional: Positive for diaphoresis. Negative for appetite change, chills, fatigue and fever.   HENT: Positive for rhinorrhea and sore throat. Negative for congestion, ear discharge, ear pain, nosebleeds, sinus pressure and trouble swallowing.    Eyes: Negative for discharge and redness.   Respiratory: Positive for cough, shortness of breath and wheezing. Negative for apnea and chest tightness.    Cardiovascular: Negative for chest pain.   Gastrointestinal: Negative for abdominal pain, diarrhea, nausea and vomiting.   Endocrine: Negative for polyuria.   Genitourinary: Negative for dysuria, frequency and urgency.   Musculoskeletal: Negative for myalgias and neck pain.   Skin: Negative for color change and rash.   Allergic/Immunologic: Negative for immunocompromised state.   Neurological: Negative for dizziness, seizures, syncope, weakness, light-headedness and headaches.   Hematological: Negative for adenopathy. Does not bruise/bleed easily.   Psychiatric/Behavioral: Negative for behavioral problems and confusion.   All other systems reviewed and are negative.      Past Medical History:   Diagnosis Date   • Abdominal pain    • Acute maxillary  sinusitis, unspecified    • Allergic rhinitis due to pollen    • Biliary dyskinesia    • Chronic cholecystitis    • Common cold    • Contact dermatitis    • Contraception    • Diarrhea    • Encounter for administrative examinations    • Exanthematous disorder    • GERD (gastroesophageal reflux disease)    • Headache    • Influenza    • Keratoconjunctivitis    • Nausea and vomiting    • Need for immunization against influenza    • Other seasonal allergic rhinitis    • Pain in eye    • Palpitation    • Purulent rhinitis    • Rash    • Right upper quadrant pain    • Urticaria        No Known Allergies    Past Surgical History:   Procedure Laterality Date   • CHOLECYSTECTOMY  10/01/2012    Cholecystectomy, laparoscopic (with intraoperative cholangiogram. Supervision & interpretation of intraoperative cholangiogram. Chronic cholecystitis. Biliary dyskinesia. Right upper quadrant pain)       Family History   Problem Relation Age of Onset   • Rheum arthritis Mother    • Anxiety disorder Mother    • No Known Problems Father    • Anxiety disorder Maternal Grandmother    • Depression Maternal Grandmother    • COPD Maternal Grandfather    • No Known Problems Paternal Grandmother    • No Known Problems Paternal Grandfather    • Diabetes type II Other        Social History     Social History   • Marital status: Single     Social History Main Topics   • Smoking status: Passive Smoke Exposure - Never Smoker   • Smokeless tobacco: Never Used   • Alcohol use Yes      Comment: rare and small amount   • Drug use: No   • Sexual activity: Yes     Birth control/ protection: Pill     Other Topics Concern   • Not on file           Objective   Physical Exam   Constitutional: She is oriented to person, place, and time. She appears well-developed and well-nourished.   HENT:   Head: Normocephalic and atraumatic.   Nose: Nose normal.   Mouth/Throat: Oropharynx is clear and moist.   Eyes: Pupils are equal, round, and reactive to light.  Conjunctivae and EOM are normal. Right eye exhibits no discharge. Left eye exhibits no discharge. No scleral icterus.   Neck: Normal range of motion. Neck supple. No tracheal deviation present.   Cardiovascular: Normal rate, regular rhythm and normal heart sounds.    No murmur heard.  Pulmonary/Chest: Effort normal. No stridor. No respiratory distress. She has decreased breath sounds. She has wheezes. She has no rales.   Abdominal: Soft. Bowel sounds are normal. She exhibits no distension and no mass. There is no tenderness. There is no rebound and no guarding.   Musculoskeletal: She exhibits no edema.   Neurological: She is alert and oriented to person, place, and time. Coordination normal.   Skin: Skin is warm and dry. No rash noted. No erythema.   Psychiatric: She has a normal mood and affect. Her behavior is normal. Thought content normal.   Nursing note and vitals reviewed.      Procedures         Labs Reviewed   PREGNANCY, URINE - Normal       XR Chest 2 View   Final Result   Negative chest      Electronically signed by:  Romeo Reid MD  10/1/2018 8:36 AM CDT   Workstation: [id number]              ED Course          Labs Reviewed   PREGNANCY, URINE - Normal       XR Chest 2 View   Final Result   Negative chest      Electronically signed by:  Romeo Reid MD  10/1/2018 8:36 AM CDT   Workstation: [id number]                    [facility]      Final diagnoses:   Upper respiratory tract infection, unspecified type            Ricardo Montgomery MD  10/01/18 1021

## 2018-10-08 ENCOUNTER — TELEPHONE (OUTPATIENT)
Dept: FAMILY MEDICINE CLINIC | Facility: CLINIC | Age: 23
End: 2018-10-08

## 2019-03-18 ENCOUNTER — OFFICE VISIT (OUTPATIENT)
Dept: FAMILY MEDICINE CLINIC | Facility: CLINIC | Age: 24
End: 2019-03-18

## 2019-03-18 VITALS
WEIGHT: 128 LBS | TEMPERATURE: 99.4 F | OXYGEN SATURATION: 100 % | HEART RATE: 68 BPM | HEIGHT: 62 IN | SYSTOLIC BLOOD PRESSURE: 110 MMHG | DIASTOLIC BLOOD PRESSURE: 80 MMHG | BODY MASS INDEX: 23.55 KG/M2

## 2019-03-18 DIAGNOSIS — J30.89 NON-SEASONAL ALLERGIC RHINITIS, UNSPECIFIED TRIGGER: ICD-10-CM

## 2019-03-18 DIAGNOSIS — R05.9 COUGH: Primary | ICD-10-CM

## 2019-03-18 DIAGNOSIS — K21.9 GASTROESOPHAGEAL REFLUX DISEASE, ESOPHAGITIS PRESENCE NOT SPECIFIED: ICD-10-CM

## 2019-03-18 PROCEDURE — 99214 OFFICE O/P EST MOD 30 MIN: CPT | Performed by: FAMILY MEDICINE

## 2019-03-18 RX ORDER — ALBUTEROL SULFATE 90 UG/1
2 AEROSOL, METERED RESPIRATORY (INHALATION) EVERY 4 HOURS PRN
Qty: 1 INHALER | Refills: 0 | Status: SHIPPED | OUTPATIENT
Start: 2019-03-18 | End: 2019-03-18 | Stop reason: SDUPTHER

## 2019-03-18 RX ORDER — PROMETHAZINE HYDROCHLORIDE AND CODEINE PHOSPHATE 6.25; 1 MG/5ML; MG/5ML
5 SYRUP ORAL EVERY 6 HOURS PRN
Qty: 240 ML | Refills: 0 | Status: SHIPPED | OUTPATIENT
Start: 2019-03-18 | End: 2019-03-29

## 2019-03-18 RX ORDER — CETIRIZINE HYDROCHLORIDE 10 MG/1
10 TABLET ORAL NIGHTLY
Qty: 30 TABLET | Refills: 11 | Status: SHIPPED | OUTPATIENT
Start: 2019-03-18 | End: 2020-12-07

## 2019-03-18 RX ORDER — OMEPRAZOLE 20 MG/1
20 CAPSULE, DELAYED RELEASE ORAL DAILY
Qty: 90 CAPSULE | Refills: 3 | Status: SHIPPED | OUTPATIENT
Start: 2019-03-18

## 2019-03-18 RX ORDER — FLUTICASONE PROPIONATE 50 MCG
2 SPRAY, SUSPENSION (ML) NASAL DAILY
Qty: 1 BOTTLE | Refills: 11 | Status: SHIPPED | OUTPATIENT
Start: 2019-03-18 | End: 2020-02-10 | Stop reason: SDDI

## 2019-03-18 RX ORDER — ALBUTEROL SULFATE 90 UG/1
2 AEROSOL, METERED RESPIRATORY (INHALATION) EVERY 4 HOURS PRN
Qty: 1 INHALER | Refills: 0 | Status: SHIPPED | OUTPATIENT
Start: 2019-03-18 | End: 2019-10-14 | Stop reason: SDUPTHER

## 2019-03-18 NOTE — PROGRESS NOTES
" Subjective   Alisa Coffey is a 23 y.o. female.     History of Present Illness     Coughing off and on since 10/18.  Has cat and dog.  Inhaler helped, would like a refill.  [facility]  Needs refill omeprazole.     Review of Systems   Constitutional: Negative for chills, fatigue and fever.   HENT: Positive for congestion and sneezing. Negative for ear discharge, ear pain, facial swelling, hearing loss, postnasal drip, rhinorrhea, sinus pressure, sore throat, trouble swallowing and voice change.    Eyes: Negative for discharge, redness and visual disturbance.   Respiratory: Positive for cough, shortness of breath and wheezing. Negative for chest tightness.    Cardiovascular: Negative for chest pain and palpitations.   Gastrointestinal: Negative for abdominal pain, blood in stool, constipation, diarrhea, nausea and vomiting.   Endocrine: Negative for polydipsia and polyuria.   Genitourinary: Negative for dysuria, flank pain, hematuria and urgency.   Musculoskeletal: Negative for arthralgias, back pain, joint swelling and myalgias.   Skin: Negative for rash.   Neurological: Negative for dizziness, weakness, numbness and headaches.   Hematological: Negative for adenopathy.   Psychiatric/Behavioral: Negative for confusion and sleep disturbance. The patient is not nervous/anxious.            /80 (BP Location: Left arm, Patient Position: Sitting, Cuff Size: Adult)   Pulse 68   Temp 99.4 °F (37.4 °C)   Ht 157.5 cm (62.01\")   Wt 58.1 kg (128 lb)   SpO2 100%   BMI 23.41 kg/m²       Objective     Physical Exam   Constitutional: She is oriented to person, place, and time. She appears well-developed and well-nourished.   HENT:   Head: Normocephalic and atraumatic.   Right Ear: External ear normal.   Left Ear: External ear normal.   Nose: Nose normal.   Mouth/Throat: Oropharynx is clear and moist.   Eyes: Conjunctivae and EOM are normal. Pupils are equal, round, " and reactive to light.   Neck: Normal range of motion. Neck supple.   Cardiovascular: Normal rate, regular rhythm and normal heart sounds. Exam reveals no gallop and no friction rub.   No murmur heard.  Pulmonary/Chest: Effort normal and breath sounds normal.   Slightly prolong exp phase   Abdominal: Soft. Bowel sounds are normal. She exhibits no distension. There is no tenderness. There is no rebound and no guarding.   Musculoskeletal: Normal range of motion. She exhibits no edema or deformity.   Neurological: She is alert and oriented to person, place, and time. No cranial nerve deficit.   Skin: Skin is warm and dry. No rash noted. No erythema.   Psychiatric: She has a normal mood and affect. Her behavior is normal. Judgment and thought content normal.   Nursing note and vitals reviewed.          PAST MEDICAL HISTORY     Past Medical History:   Diagnosis Date   • Abdominal pain    • Acute maxillary sinusitis, unspecified    • Allergic rhinitis due to pollen    • Biliary dyskinesia    • Chronic cholecystitis    • Common cold    • Contact dermatitis    • Contraception    • Diarrhea    • Encounter for administrative examinations    • Exanthematous disorder    • GERD (gastroesophageal reflux disease)    • Headache    • Influenza    • Keratoconjunctivitis    • Nausea and vomiting    • Need for immunization against influenza    • Other seasonal allergic rhinitis    • Pain in eye    • Palpitation    • Purulent rhinitis    • Rash    • Right upper quadrant pain    • Urticaria       PAST SURGICAL HISTORY     Past Surgical History:   Procedure Laterality Date   • CHOLECYSTECTOMY  10/01/2012    Cholecystectomy, laparoscopic (with intraoperative cholangiogram. Supervision & interpretation of intraoperative cholangiogram. Chronic cholecystitis. Biliary dyskinesia. Right upper quadrant pain)      SOCIAL HISTORY     Social History     Socioeconomic History   • Marital status: Single     Spouse name: Not on file   • Number of  children: Not on file   • Years of education: Not on file   • Highest education level: Not on file   Tobacco Use   • Smoking status: Passive Smoke Exposure - Never Smoker   • Smokeless tobacco: Never Used   Substance and Sexual Activity   • Alcohol use: Yes     Comment: rare and small amount   • Drug use: No   • Sexual activity: Yes     Birth control/protection: Pill      ALLERGIES   Patient has no known allergies.   MEDICATIONS     Current Outpatient Medications   Medication Sig Dispense Refill   • albuterol (PROVENTIL) (2.5 MG/3ML) 0.083% nebulizer solution Take 2.5 mg by nebulization Every 4 (Four) Hours As Needed for Wheezing. 50 vial 0   • albuterol sulfate  (90 Base) MCG/ACT inhaler Inhale 2 puffs Every 4 (Four) Hours As Needed for Wheezing. 1 inhaler 0   • Biotin 1 MG capsule Take 1 capsule by mouth Daily.     • loratadine (CLARITIN) 10 MG tablet Take 1 tablet by mouth Daily. 30 tablet 0   • Multiple Vitamins-Minerals (MULTIVITAMIN PO) Take 1 tablet by mouth.     • norgestimate-ethinyl estradiol (TRI-SPRINTEC) 0.18/0.215/0.25 MG-35 MCG per tablet Take 1 tablet by mouth Daily.     • omeprazole (PRILOSEC) 20 MG capsule Take 1 capsule by mouth Daily. 90 capsule 3   • cetirizine (zyrTEC) 10 MG tablet Take 1 tablet by mouth Every Night. 30 tablet 11   • fluticasone (FLONASE) 50 MCG/ACT nasal spray 2 sprays into the nostril(s) as directed by provider Daily. 1 bottle 11   • promethazine-codeine (PHENERGAN with CODEINE) 6.25-10 MG/5ML syrup Take 5 mL by mouth Every 6 (Six) Hours As Needed for Cough. 240 mL 0     No current facility-administered medications for this visit.         The following portions of the patient's history were reviewed and updated as appropriate: allergies, current medications, past family history, past medical history, past social history, past surgical history and problem list.        Assessment/Plan   Alisa was seen today for cough.    Diagnoses and all orders for this  visit:    Cough  -     Ambulatory Referral to Allergy    Gastroesophageal reflux disease, esophagitis presence not specified  -     omeprazole (PRILOSEC) 20 MG capsule; Take 1 capsule by mouth Daily.    Non-seasonal allergic rhinitis, unspecified trigger    Other orders  -     Discontinue: albuterol sulfate  (90 Base) MCG/ACT inhaler; Inhale 2 puffs Every 4 (Four) Hours As Needed for Wheezing.  -     fluticasone (FLONASE) 50 MCG/ACT nasal spray; 2 sprays into the nostril(s) as directed by provider Daily.  -     cetirizine (zyrTEC) 10 MG tablet; Take 1 tablet by mouth Every Night.  -     promethazine-codeine (PHENERGAN with CODEINE) 6.25-10 MG/5ML syrup; Take 5 mL by mouth Every 6 (Six) Hours As Needed for Cough.  -     albuterol sulfate  (90 Base) MCG/ACT inhaler; Inhale 2 puffs Every 4 (Four) Hours As Needed for Wheezing.      Suspect allergic cat/dog.  She wants to see pulmonologist/allergy doctor.                    No Follow-up on file.                  This document has been electronically signed by Golden Anthony MD on March 18, 2019 4:38 PM

## 2019-03-29 ENCOUNTER — PROCEDURE VISIT (OUTPATIENT)
Dept: PULMONOLOGY | Facility: CLINIC | Age: 24
End: 2019-03-29

## 2019-03-29 ENCOUNTER — HOSPITAL ENCOUNTER (OUTPATIENT)
Dept: GENERAL RADIOLOGY | Facility: HOSPITAL | Age: 24
Discharge: HOME | End: 2019-03-29
Admitting: INTERNAL MEDICINE

## 2019-03-29 ENCOUNTER — APPOINTMENT (OUTPATIENT)
Dept: LAB | Facility: HOSPITAL | Age: 24
End: 2019-03-29

## 2019-03-29 ENCOUNTER — OFFICE VISIT (OUTPATIENT)
Dept: PULMONOLOGY | Facility: CLINIC | Age: 24
End: 2019-03-29

## 2019-03-29 VITALS
SYSTOLIC BLOOD PRESSURE: 127 MMHG | WEIGHT: 128.5 LBS | OXYGEN SATURATION: 98 % | DIASTOLIC BLOOD PRESSURE: 76 MMHG | BODY MASS INDEX: 23.65 KG/M2 | HEART RATE: 90 BPM | HEIGHT: 62 IN

## 2019-03-29 DIAGNOSIS — J30.1 ALLERGIC RHINITIS DUE TO POLLEN, UNSPECIFIED SEASONALITY: ICD-10-CM

## 2019-03-29 DIAGNOSIS — R05.9 COUGH: Primary | ICD-10-CM

## 2019-03-29 DIAGNOSIS — R05.3 CHRONIC COUGH: ICD-10-CM

## 2019-03-29 DIAGNOSIS — R05.9 COUGH: ICD-10-CM

## 2019-03-29 PROCEDURE — 86003 ALLG SPEC IGE CRUDE XTRC EA: CPT | Performed by: INTERNAL MEDICINE

## 2019-03-29 PROCEDURE — 99204 OFFICE O/P NEW MOD 45 MIN: CPT | Performed by: INTERNAL MEDICINE

## 2019-03-29 PROCEDURE — 36415 COLL VENOUS BLD VENIPUNCTURE: CPT | Performed by: INTERNAL MEDICINE

## 2019-03-29 PROCEDURE — 94010 BREATHING CAPACITY TEST: CPT | Performed by: INTERNAL MEDICINE

## 2019-03-29 PROCEDURE — 71046 X-RAY EXAM CHEST 2 VIEWS: CPT

## 2019-03-29 NOTE — PROGRESS NOTES
Alisa Coffey is a 23 y.o. female.     Chief Complaint   Patient presents with   • Cough     ref-Dr. Anthony       History of Present Illness   This 23-year-old has a 6-month history of a cough.  She also has episodes of shortness of breath wheeze and flushing.  She has shortness of breath when she gets hot and coughs when she gets hot.  She also has seasonal allergic rhinitis with nasal congestion.  She denies a history of asthma.  She has a history of anxiety.  Her symptoms really started in the fall.  Surgical history cholecystectomy.  Medications please see her list.  Medication allergies unknown.  Family history is positive for asthma.  Social history no alcohol or tobacco.  She has cats and dogs at home  The following portions of the patient's history were reviewed and updated as appropriate: allergies, current medications, past family history, past medical history, past social history, past surgical history and problem list.    Review of Systems   Constitutional: Negative.  Negative for activity change, appetite change, chills, diaphoresis, fatigue, fever and unexpected weight change.   HENT: Positive for postnasal drip, rhinorrhea and sneezing. Negative for congestion, ear discharge, ear pain, facial swelling, hearing loss, mouth sores, nosebleeds, sinus pressure, tinnitus, trouble swallowing and voice change.    Eyes: Negative for pain, discharge, redness and itching.   Respiratory: Positive for cough, chest tightness, shortness of breath and wheezing.    Cardiovascular: Negative for chest pain.   Gastrointestinal: Negative for abdominal pain, blood in stool, diarrhea and nausea.   Endocrine: Negative for cold intolerance, heat intolerance, polydipsia, polyphagia and polyuria.   Genitourinary: Negative for dysuria and urgency.   Musculoskeletal: Negative for arthralgias, gait problem, myalgias, neck pain and neck stiffness.   Skin: Negative for color change, pallor and rash.  "  Allergic/Immunologic: Negative for environmental allergies, food allergies and immunocompromised state.   Neurological: Negative.  Negative for dizziness, tremors, syncope, facial asymmetry, weakness, light-headedness, numbness and headaches.   Hematological: Negative for adenopathy.   Psychiatric/Behavioral: Negative for agitation, behavioral problems, confusion, decreased concentration, dysphoric mood and hallucinations. The patient is nervous/anxious. The patient is not hyperactive.        /76   Pulse 90   Ht 157.5 cm (62\")   Wt 58.3 kg (128 lb 8 oz)   SpO2 98%   BMI 23.50 kg/m²   Physical Exam   Constitutional: She is oriented to person, place, and time. She appears well-developed and well-nourished. No distress.   HENT:   Head: Normocephalic and atraumatic.   Mouth/Throat: No oropharyngeal exudate.   Eyes: EOM are normal. Pupils are equal, round, and reactive to light. Right eye exhibits no discharge. Left eye exhibits no discharge. No scleral icterus.   Neck: Normal range of motion. Neck supple. No JVD present. No tracheal deviation present. No thyromegaly present.   Cardiovascular: Exam reveals no gallop and no friction rub.   No murmur heard.  Pulmonary/Chest: Effort normal and breath sounds normal. No stridor. No respiratory distress. She has no wheezes. She has no rales. She exhibits no tenderness.   Abdominal: Soft. Bowel sounds are normal. She exhibits no distension and no mass. There is no tenderness. There is no guarding.   Musculoskeletal: Normal range of motion. She exhibits no edema, tenderness or deformity.   Lymphadenopathy:     She has no cervical adenopathy.   Neurological: She is alert and oriented to person, place, and time. No cranial nerve deficit. Coordination normal.   Skin: No rash noted. She is not diaphoretic. No erythema.   Psychiatric: She has a normal mood and affect. Her behavior is normal.   Nursing note and vitals reviewed.    Chest x-ray is normal  Spirometry reveals " mild obstruction  Assessment/Plan   Alisa was seen today for cough.    Diagnoses and all orders for this visit:    Cough  -     XR Chest 2 View; Future  -     Allergens, Zone 5  -     Spirometry Without Bronchodilator    Chronic cough  -     Allergens, Zone 5  -     Spirometry Without Bronchodilator    Allergic rhinitis due to pollen, unspecified seasonality  -     Allergens, Zone 5  -     Spirometry Without Bronchodilator      Assessment mild persistent asthma, probable allergic rhinitis    Plan Brio inhaler regularly, allergy blood test, continue as needed albuterol, return in 2 weeks        This document has been produced with the assistance of Dragon dictation  This document has been electronically signed by Campbell Moncada MD on March 29, 2019 9:44 AM

## 2019-04-07 LAB
A ALTERNATA IGE QN: <0.1 KU/L
A FUMIGATUS IGE QN: <0.1 KU/L
AMER ROACH IGE QN: <0.1 KU/L
BAHIA GRASS IGE QN: <0.1 KU/L
BAYBERRY POLN IGE QN: <0.1 KU/L
BERMUDA GRASS IGE QN: <0.1 KU/L
BOXELDER IGE QN: <0.1 KU/L
C HERBARUM IGE QN: <0.1 KU/L
CAT DANDER IGG QN: 0.25 KU/L
COMMON RAGWEED IGE QN: <0.1 KU/L
CONV CLASS DESCRIPTION: ABNORMAL
D FARINAE IGE QN: <0.1 KU/L
D PTERONYSS IGE QN: <0.1 KU/L
DOG DANDER IGE QN: 0.15 KU/L
DOG FENNEL IGE QN: <0.1 KU/L
ENGL PLANTAIN IGE QN: <0.1 KU/L
GOOSEFOOT IGE QN: <0.1 KU/L
GUM-TREE IGE QN: <0.1 KU/L
ITALIAN CYPRESS IGE QN: <0.1 KU/L
JOHNSON GRASS IGE QN: <0.1 KU/L
M RACEMOSUS IGE QN: <0.1 KU/L
P NOTATUM IGE QN: <0.1 KU/L
PEPPER TREE IGE QN: <0.1 KU/L
PER RYE GRASS IGE QN: <0.1 KU/L
QUEEN PALM IGE QN: <0.1 KU/L
S BOTRYOSUM IGE QN: <0.1 KU/L
SHEEP SORREL IGE QN: <0.1 KU/L
T210-IGE PRIVET, COMMON: <0.1 KU/L
VIRG LIVE OAK IGE QN: <0.1 KU/L
WHITE ELM IGE QN: <0.1 KU/L

## 2019-04-12 ENCOUNTER — OFFICE VISIT (OUTPATIENT)
Dept: PULMONOLOGY | Facility: CLINIC | Age: 24
End: 2019-04-12

## 2019-04-12 VITALS
HEART RATE: 78 BPM | SYSTOLIC BLOOD PRESSURE: 106 MMHG | DIASTOLIC BLOOD PRESSURE: 66 MMHG | WEIGHT: 127 LBS | OXYGEN SATURATION: 98 % | HEIGHT: 62 IN | BODY MASS INDEX: 23.37 KG/M2

## 2019-04-12 DIAGNOSIS — J45.30 MILD PERSISTENT ASTHMA WITHOUT COMPLICATION: Primary | ICD-10-CM

## 2019-04-12 DIAGNOSIS — J30.1 ALLERGIC RHINITIS DUE TO POLLEN, UNSPECIFIED SEASONALITY: ICD-10-CM

## 2019-04-12 PROCEDURE — 99213 OFFICE O/P EST LOW 20 MIN: CPT | Performed by: INTERNAL MEDICINE

## 2019-04-12 RX ORDER — ALBUTEROL SULFATE 90 UG/1
AEROSOL, METERED RESPIRATORY (INHALATION)
Qty: 1 INHALER | Refills: 5 | Status: SHIPPED | OUTPATIENT
Start: 2019-04-12 | End: 2020-02-10

## 2019-04-12 NOTE — PROGRESS NOTES
"This 23-year-old has allergic rhinitis and asthma.  Since starting Brio her symptoms have improved.  She is no longer coughing or wheezing    ROS    Constitutional-no night sweats weight loss headaches  GI no abdominal pain nausea or diarrhea  Neuro no seizure or neurologic deficits  Musculoskeletal no deformity or joint pain   no dysuria or hematuria  Skin no rash or other lesions  All other systems reviewed and were negative except for the above.      Physical Exam  /66   Pulse 78   Ht 157.5 cm (62\")   Wt 57.6 kg (127 lb)   SpO2 98%   BMI 23.23 kg/m²   Vital signs as above  Pupils equally round and reactive to light and accommodation, neck no JVD or adenopathy.  Cardiovascular regular rhythm and rate no murmur or gallop.  Abdomen soft no organomegaly tenderness.  Extremities no clubbing cyanosis or edema.  No cervical adenopathy.  No skin rash.  Neurologic good strength bilaterally without deficits  Nose throat lungs are clear    Allergy blood test revealed positive to cat and dog    Impression mild persistent  asthma controlled and allergic rhinitis    Plan animal avoidance, continue present medications, return in 6 months        This document has been produced with the assistance of Dragon dictation  This document has been electronically signed by Campbell Moncada MD on April 12, 2019 8:49 AM      "

## 2019-10-08 ENCOUNTER — OFFICE VISIT (OUTPATIENT)
Dept: OBSTETRICS AND GYNECOLOGY | Facility: CLINIC | Age: 24
End: 2019-10-08

## 2019-10-08 VITALS
HEIGHT: 62 IN | WEIGHT: 121 LBS | SYSTOLIC BLOOD PRESSURE: 108 MMHG | BODY MASS INDEX: 22.26 KG/M2 | DIASTOLIC BLOOD PRESSURE: 70 MMHG

## 2019-10-08 DIAGNOSIS — Z32.00 PREGNANCY EXAMINATION OR TEST, PREGNANCY UNCONFIRMED: Primary | ICD-10-CM

## 2019-10-08 DIAGNOSIS — Z34.90 PREGNANCY WITH FETUS OF UNKNOWN GESTATIONAL AGE: ICD-10-CM

## 2019-10-08 LAB
B-HCG UR QL: POSITIVE
INTERNAL NEGATIVE CONTROL: NEGATIVE
INTERNAL POSITIVE CONTROL: POSITIVE
Lab: ABNORMAL

## 2019-10-08 PROCEDURE — 81025 URINE PREGNANCY TEST: CPT | Performed by: NURSE PRACTITIONER

## 2019-10-08 PROCEDURE — 99213 OFFICE O/P EST LOW 20 MIN: CPT | Performed by: NURSE PRACTITIONER

## 2019-10-08 RX ORDER — PRENATAL VIT/IRON FUM/FOLIC AC 27 MG-1 MG
1 TABLET ORAL DAILY
Qty: 30 EACH | Refills: 11 | Status: SHIPPED | OUTPATIENT
Start: 2019-10-08 | End: 2019-11-07

## 2019-10-08 NOTE — PROGRESS NOTES
Subjective   Alisa Coffey is a 24 y.o. here for no periods since July    LMP: July 10, 2019 (apx)  PAP: 2/28/19, NIL    States she has had two positive home pregnancy tests and a positive quant at the [facility]. Has stopped taking her Tri-Sprintec around the time she took a pregnancy test.       Gynecologic Exam   The patient's primary symptoms include missed menses. The patient's pertinent negatives include no genital itching, genital lesions, genital odor, genital rash, pelvic pain, vaginal bleeding or vaginal discharge. Associated symptoms include nausea. Pertinent negatives include no abdominal pain, chills, constipation, diarrhea, dysuria, fever, frequency, rash, sore throat or vomiting. She uses oral contraceptives for contraception. Her past medical history is significant for ovarian cysts.       The following portions of the patient's history were reviewed and updated as appropriate: allergies, current medications, past family history, past medical history, past social history, past surgical history and problem list.    Review of Systems   Constitutional: Negative for chills, fatigue, fever, unexpected weight gain and unexpected weight loss.   HENT: Negative for sneezing and sore throat.    Respiratory: Negative for shortness of breath.    Cardiovascular: Negative for chest pain and palpitations.   Gastrointestinal: Positive for nausea. Negative for abdominal pain, constipation, diarrhea and vomiting.   Endocrine: Negative for cold intolerance and heat intolerance.   Genitourinary: Positive for amenorrhea and missed menses. Negative for breast discharge, breast lump, breast pain, difficulty urinating, dysuria, frequency, menstrual problem, pelvic pain, pelvic pressure, urinary incontinence, vaginal bleeding, vaginal discharge and vaginal pain.        Breast tenderness   Skin: Negative for rash.   Neurological: Negative for weakness and headache.   Psychiatric/Behavioral: Negative  for sleep disturbance, depressed mood and stress.       Objective   Physical Exam   Constitutional: She is oriented to person, place, and time. She appears well-developed and well-nourished.   HENT:   Head: Normocephalic.   Neck: Normal range of motion. Neck supple.   Cardiovascular: Normal rate and regular rhythm.   Pulmonary/Chest: Effort normal and breath sounds normal.   Abdominal: Soft.   Audible +FHR via doppler   Genitourinary:   Genitourinary Comments: No GYN complaints. Pelvic exam deferred.      Musculoskeletal: Normal range of motion.   Neurological: She is alert and oriented to person, place, and time.   Skin: Skin is warm and dry.   Psychiatric: She has a normal mood and affect. Her behavior is normal.   Nursing note and vitals reviewed.        Assessment/Plan   Alisa was seen today for menstrual problem.    Diagnoses and all orders for this visit:    Pregnancy examination or test, pregnancy unconfirmed  -     POC Pregnancy, Urine    Pregnancy with fetus of unknown gestational age  -     Prenatal Vit-Fe Fumarate-FA (PRENATAL/FOLIC ACID) tablet; Take 1 tablet by mouth Daily for 30 days.        + Urine pregnancy test today. Instructed to begin taking PNV; avoid NSAIDs in pregnancy, tylenol PRN for pain or HA. Reviewed return precautions (vaginal bleeding/LOF). F/U with new OB visit and dating U/S in two weeks.

## 2019-10-14 ENCOUNTER — OFFICE VISIT (OUTPATIENT)
Dept: PULMONOLOGY | Facility: CLINIC | Age: 24
End: 2019-10-14

## 2019-10-14 VITALS
BODY MASS INDEX: 21.94 KG/M2 | WEIGHT: 119.2 LBS | HEIGHT: 62 IN | DIASTOLIC BLOOD PRESSURE: 67 MMHG | SYSTOLIC BLOOD PRESSURE: 116 MMHG | OXYGEN SATURATION: 99 % | HEART RATE: 73 BPM

## 2019-10-14 DIAGNOSIS — J45.20 INTERMITTENT ASTHMA WITHOUT COMPLICATION, UNSPECIFIED ASTHMA SEVERITY: Primary | ICD-10-CM

## 2019-10-14 PROCEDURE — 99213 OFFICE O/P EST LOW 20 MIN: CPT | Performed by: INTERNAL MEDICINE

## 2019-10-14 RX ORDER — ALBUTEROL SULFATE 90 UG/1
AEROSOL, METERED RESPIRATORY (INHALATION)
Qty: 1 INHALER | Refills: 5 | Status: SHIPPED | OUTPATIENT
Start: 2019-10-14 | End: 2022-03-02 | Stop reason: SDUPTHER

## 2019-10-14 RX ORDER — ALBUTEROL SULFATE 2.5 MG/3ML
2.5 SOLUTION RESPIRATORY (INHALATION) EVERY 6 HOURS PRN
Qty: 90 VIAL | Refills: 5 | Status: SHIPPED | OUTPATIENT
Start: 2019-10-14 | End: 2020-02-10

## 2019-10-14 NOTE — PROGRESS NOTES
"This 23-year-old has asthma.  Her breathing is doing reasonable lung present medications.  She would like a nebulizer.    ROS    Constitutional-no night sweats weight loss headaches  GI no abdominal pain nausea or diarrhea  Neuro no seizure or neurologic deficits  Musculoskeletal no deformity or joint pain   no dysuria or hematuria  Skin no rash or other lesions  All other systems reviewed and were negative except for the above.      Physical Exam  /67   Pulse 73   Ht 157.5 cm (62\")   Wt 54.1 kg (119 lb 3.2 oz)   LMP 07/08/2019 (Approximate)   SpO2 99%   BMI 21.80 kg/m²   Vital signs as above  Pupils equally round and reactive to light and accommodation, neck no JVD or adenopathy.  Cardiovascular regular rhythm and rate no murmur or gallop.  Abdomen soft no organomegaly tenderness.  Extremities no clubbing cyanosis or edema.  No cervical adenopathy.  No skin rash.  Neurologic good strength bilaterally without deficits  Alert no distress lungs are clear    Impression asthma and rhinitis    Plan continue present medications, will call concerning which bronchodilators her insurance covers, add nebulized albuterol as needed, return in 6 months        This document has been produced with the assistance of Dragon dictation  This document has been electronically signed by Campbell Moncada MD on October 14, 2019 9:16 AM      "

## 2019-10-22 ENCOUNTER — INITIAL PRENATAL (OUTPATIENT)
Dept: OBSTETRICS AND GYNECOLOGY | Facility: CLINIC | Age: 24
End: 2019-10-22

## 2019-10-22 ENCOUNTER — APPOINTMENT (OUTPATIENT)
Dept: LAB | Facility: HOSPITAL | Age: 24
End: 2019-10-22

## 2019-10-22 VITALS — WEIGHT: 120 LBS | DIASTOLIC BLOOD PRESSURE: 64 MMHG | BODY MASS INDEX: 21.95 KG/M2 | SYSTOLIC BLOOD PRESSURE: 108 MMHG

## 2019-10-22 DIAGNOSIS — Z34.01 PRIMIGRAVIDA IN FIRST TRIMESTER: ICD-10-CM

## 2019-10-22 DIAGNOSIS — Z34.01 ENCOUNTER FOR SUPERVISION OF NORMAL FIRST PREGNANCY IN FIRST TRIMESTER: Primary | ICD-10-CM

## 2019-10-22 DIAGNOSIS — Z3A.11 11 WEEKS GESTATION OF PREGNANCY: Primary | ICD-10-CM

## 2019-10-22 DIAGNOSIS — O99.511 ASTHMA AFFECTING PREGNANCY IN FIRST TRIMESTER: ICD-10-CM

## 2019-10-22 DIAGNOSIS — J45.909 ASTHMA AFFECTING PREGNANCY IN FIRST TRIMESTER: ICD-10-CM

## 2019-10-22 DIAGNOSIS — O21.9 NAUSEA AND VOMITING IN PREGNANCY: ICD-10-CM

## 2019-10-22 LAB
ABO GROUP BLD: NORMAL
AMPHET+METHAMPHET UR QL: NEGATIVE
AMPHETAMINES UR QL: NEGATIVE
BARBITURATES UR QL SCN: NEGATIVE
BASOPHILS # BLD AUTO: 0.03 10*3/MM3 (ref 0–0.2)
BASOPHILS NFR BLD AUTO: 0.4 % (ref 0–1.5)
BENZODIAZ UR QL SCN: NEGATIVE
BLD GP AB SCN SERPL QL: NEGATIVE
BUPRENORPHINE SERPL-MCNC: NEGATIVE NG/ML
CANNABINOIDS SERPL QL: NEGATIVE
COCAINE UR QL: NEGATIVE
DEPRECATED RDW RBC AUTO: 40.8 FL (ref 37–54)
EOSINOPHIL # BLD AUTO: 0.2 10*3/MM3 (ref 0–0.4)
EOSINOPHIL NFR BLD AUTO: 2.5 % (ref 0.3–6.2)
ERYTHROCYTE [DISTWIDTH] IN BLOOD BY AUTOMATED COUNT: 11.8 % (ref 12.3–15.4)
HBV SURFACE AG SERPL QL IA: NORMAL
HCT VFR BLD AUTO: 38.8 % (ref 34–46.6)
HCV AB SER DONR QL: NORMAL
HGB BLD-MCNC: 13.5 G/DL (ref 12–15.9)
HIV1+2 AB SER QL: NORMAL
IMM GRANULOCYTES # BLD AUTO: 0.02 10*3/MM3 (ref 0–0.05)
IMM GRANULOCYTES NFR BLD AUTO: 0.2 % (ref 0–0.5)
LYMPHOCYTES # BLD AUTO: 2.21 10*3/MM3 (ref 0.7–3.1)
LYMPHOCYTES NFR BLD AUTO: 27.2 % (ref 19.6–45.3)
Lab: NORMAL
MCH RBC QN AUTO: 32.5 PG (ref 26.6–33)
MCHC RBC AUTO-ENTMCNC: 34.8 G/DL (ref 31.5–35.7)
MCV RBC AUTO: 93.3 FL (ref 79–97)
METHADONE UR QL SCN: NEGATIVE
MONOCYTES # BLD AUTO: 0.55 10*3/MM3 (ref 0.1–0.9)
MONOCYTES NFR BLD AUTO: 6.8 % (ref 5–12)
NEUTROPHILS # BLD AUTO: 5.12 10*3/MM3 (ref 1.7–7)
NEUTROPHILS NFR BLD AUTO: 62.9 % (ref 42.7–76)
NRBC BLD AUTO-RTO: 0 /100 WBC (ref 0–0.2)
OPIATES UR QL: NEGATIVE
OXYCODONE UR QL SCN: NEGATIVE
PCP UR QL SCN: NEGATIVE
PLAT MORPH BLD: NORMAL
PLATELET # BLD AUTO: 223 10*3/MM3 (ref 140–450)
PMV BLD AUTO: 10.8 FL (ref 6–12)
PROPOXYPH UR QL: NEGATIVE
RBC # BLD AUTO: 4.16 10*6/MM3 (ref 3.77–5.28)
RBC MORPH BLD: NORMAL
RH BLD: NEGATIVE
TRICYCLICS UR QL SCN: NEGATIVE
WBC MORPH BLD: NORMAL
WBC NRBC COR # BLD: 8.13 10*3/MM3 (ref 3.4–10.8)

## 2019-10-22 PROCEDURE — 87086 URINE CULTURE/COLONY COUNT: CPT | Performed by: NURSE PRACTITIONER

## 2019-10-22 PROCEDURE — 81003 URINALYSIS AUTO W/O SCOPE: CPT | Performed by: NURSE PRACTITIONER

## 2019-10-22 PROCEDURE — 85007 BL SMEAR W/DIFF WBC COUNT: CPT | Performed by: NURSE PRACTITIONER

## 2019-10-22 PROCEDURE — 87661 TRICHOMONAS VAGINALIS AMPLIF: CPT | Performed by: NURSE PRACTITIONER

## 2019-10-22 PROCEDURE — 87491 CHLMYD TRACH DNA AMP PROBE: CPT | Performed by: NURSE PRACTITIONER

## 2019-10-22 PROCEDURE — 36415 COLL VENOUS BLD VENIPUNCTURE: CPT

## 2019-10-22 PROCEDURE — 80081 OBSTETRIC PANEL INC HIV TSTG: CPT | Performed by: NURSE PRACTITIONER

## 2019-10-22 PROCEDURE — 87591 N.GONORRHOEAE DNA AMP PROB: CPT | Performed by: NURSE PRACTITIONER

## 2019-10-22 PROCEDURE — 80307 DRUG TEST PRSMV CHEM ANLYZR: CPT | Performed by: NURSE PRACTITIONER

## 2019-10-22 PROCEDURE — 86803 HEPATITIS C AB TEST: CPT | Performed by: NURSE PRACTITIONER

## 2019-10-22 PROCEDURE — 0501F PRENATAL FLOW SHEET: CPT | Performed by: NURSE PRACTITIONER

## 2019-10-22 RX ORDER — DIPHENHYDRAMINE HYDROCHLORIDE 25 MG/1
25 CAPSULE ORAL 3 TIMES DAILY
Qty: 90 TABLET | Refills: 2 | Status: SHIPPED | OUTPATIENT
Start: 2019-10-22 | End: 2020-03-09 | Stop reason: ALTCHOICE

## 2019-10-22 NOTE — PROGRESS NOTES
[facility]  Obstetrics Visit    CHIEF COMPLAINT:  New prenatal visit    HISTORY OF PRESENT ILLNESS:  Alisa Coffey is a 24 y.o. y/o  at 11w3d by early dating TVUS  (Patient's last menstrual period was 2019 aproximate). This was a unplanned pregnancy and the patient is supported by her boyfriend and León BARBER. Reports nausea, no vomiting. Reports breast tenderness. She denies any vaginal bleeding. She has started taking a prenatal vitamin.    REVIEW OF SYSTEMS  Review of Systems   Constitutional: Negative for activity change, appetite change, fatigue and fever.   HENT: Negative for sore throat.    Eyes: Negative for pain.   Respiratory: Negative for shortness of breath.    Gastrointestinal: Positive for nausea. Negative for vomiting.   Endocrine: Negative for cold intolerance and heat intolerance.   Genitourinary: Positive for vaginal discharge. Negative for dysuria, frequency and urgency.   Musculoskeletal: Negative for arthralgias, back pain and myalgias.   Skin: Negative for rash.   Neurological: Negative for light-headedness and headaches.   Psychiatric/Behavioral: Negative for agitation, dysphoric mood and sleep disturbance. The patient is not nervous/anxious.        PRENATAL RISK FACTORS  10/19 Problems (from 10/22/19 to present)     Problem Noted Resolved    Nausea and vomiting in pregnancy 10/22/2019 by Callie Lawson APRN No    Overview Signed 10/22/2019  3:46 PM by Callie Lawson APRN     Pyridoxine ordered 10/22/19         Primigravida in first trimester 10/22/2019 by Callie Lawson APRN No    Overview Addendum 10/25/2019  3:41 PM by Callie Lawson APRN     A neg/ neg antibody/ Rubella Immune/GBS unk  Dating: EVER 2020 by BEN (10/22/19)  Genetics: Offer at next visit  Tdap: @ 28 wks  Flu: Offer at next visit  Anatomy: @ 20 wks  1h Glucola: @ 28 wk  H&H/Plts: @ 28  Hgb/Hct/Plt: 13.5/38.8/223  Breast/Bottle:?          Asthma affecting pregnancy in  first trimester 10/22/2019 by Callie Lawson APRN No    Overview Signed 10/25/2019  3:40 PM by Callie Lawson APRN     Albuterol inhaler PRN.                DATING CRITERIA:  1TUS (10/22/19 at 11w3d) -- EVER 2020      OBSTETRIC HISTORY:  OB History    Para Term  AB Living   1             SAB TAB Ectopic Molar Multiple Live Births                    # Outcome Date GA Lbr Moises/2nd Weight Sex Delivery Anes PTL Lv   1 Current                 GYN HISTORY:  Denies h/o sexually transmitted infections/pelvic inflammatory disease  Denies h/o abnormal pap smears  Last pap smear:  19, NIL  Last Completed Pap Smear       Status Date      PAP SMEAR Done 2019 SCANNED - PAP SMEAR     Patient has more history with this topic...        Denies h/o gynecologic surgeries, including biopsies of the cervix    PAST MEDICAL HISTORY:  Past Medical History:   Diagnosis Date   • Abdominal pain    • Acute maxillary sinusitis, unspecified    • Allergic rhinitis due to pollen    • Asthma    • Biliary dyskinesia    • Chronic cholecystitis    • Common cold    • Contact dermatitis    • Contraception    • Diarrhea    • Encounter for administrative examinations    • Exanthematous disorder    • GERD (gastroesophageal reflux disease)    • Headache    • Influenza    • Keratoconjunctivitis    • Nausea and vomiting    • Need for immunization against influenza    • Other seasonal allergic rhinitis    • Ovarian cyst    • Pain in eye    • Palpitation    • Purulent rhinitis    • Rash    • Right upper quadrant pain    • Urticaria      PAST SURGICAL HISTORY:  Past Surgical History:   Procedure Laterality Date   • CHOLECYSTECTOMY  10/01/2012    Cholecystectomy, laparoscopic (with intraoperative cholangiogram. Supervision & interpretation of intraoperative cholangiogram. Chronic cholecystitis. Biliary dyskinesia. Right upper quadrant pain)   • LAPAROSCOPIC CHOLECYSTECTOMY     • WISDOM TOOTH EXTRACTION       FAMILY  HISTORY:  Family History   Problem Relation Age of Onset   • Rheum arthritis Mother    • Anxiety disorder Mother    • No Known Problems Father    • Anxiety disorder Maternal Grandmother    • Depression Maternal Grandmother    • COPD Maternal Grandfather    • No Known Problems Paternal Grandmother    • No Known Problems Paternal Grandfather    • Diabetes type II Other      SOCIAL HISTORY:  Social History     Socioeconomic History   • Marital status: Single     Spouse name: Not on file   • Number of children: Not on file   • Years of education: Not on file   • Highest education level: Not on file   Tobacco Use   • Smoking status: Passive Smoke Exposure - Never Smoker   • Smokeless tobacco: Never Used   Substance and Sexual Activity   • Alcohol use: Yes     Comment: rare and small amount   • Drug use: No   • Sexual activity: Yes     Partners: Male     GENETIC SCREENING:  Age >34 yo as of EVER: No  Thalassemia: No  NTD: No  CHD: No  Down Syndrome/MR/Fragile X/Autism: No  Ashkenazi Jew with Philip-Sachs, [place], familial dysautonomia: No  Sickle cell disease or trait: No  Hemophilia: No  Muscular dystrophy: No  Cystic fibrosis: No  Washburn's chorea: No  Birth defects: No  Genetic/chromosomal disorders: No    INFECTION HISTORY:  TB exposure: No  HSV: No  Illness since LMP: No  Prior GBS infected child: No  STIs: No    ALLERGIES:  Allergies   Allergen Reactions   • Hydrocodone-Acetaminophen Itching     MEDICATIONS:  Prior to Admission medications    Medication Sig Start Date End Date Taking? Authorizing Provider   albuterol (PROVENTIL) (2.5 MG/3ML) 0.083% nebulizer solution Take 2.5 mg by nebulization Every 4 (Four) Hours As Needed for Wheezing. 10/1/18   Ricardo Montgomery MD   albuterol (PROVENTIL) (2.5 MG/3ML) 0.083% nebulizer solution Take 2.5 mg by nebulization Every 6 (Six) Hours As Needed for Wheezing. 10/14/19   Campbell Moncada MD   albuterol sulfate HFA (VENTOLIN HFA) 108 (90 Base) MCG/ACT inhaler 2 puffs  every 4 hours as needed for breathing 4/12/19   Campbell Moncada MD   albuterol sulfate HFA (VENTOLIN HFA) 108 (90 Base) MCG/ACT inhaler 2 puffs every 4 hours as needed for breathing 10/14/19   Campbell Moncada MD   BREO ELLIPTA 200-25 MCG/INH inhaler Inhale 1 puff Daily. 4/12/19   Campbell Moncada MD   cetirizine (zyrTEC) 10 MG tablet Take 1 tablet by mouth Every Night. 3/18/19   Golden Anthony MD   fluticasone (FLONASE) 50 MCG/ACT nasal spray 2 sprays into the nostril(s) as directed by provider Daily. 3/18/19   Golden Anthony MD   omeprazole (PRILOSEC) 20 MG capsule Take 1 capsule by mouth Daily. 3/18/19   Golden Anthony MD   Prenatal Vit-Fe Fumarate-FA (PRENATAL/FOLIC ACID) tablet Take 1 tablet by mouth Daily for 30 days. 10/8/19 11/7/19  Callie Lawson APRN   vitamin B-6 (PYRIDOXINE) 25 MG tablet Take 1 tablet by mouth 3 (Three) Times a Day. 10/22/19   Callie Lawson APRN     PHYSICAL EXAM:   LMP 07/11/2019   General: Alert, healthy, no distress, well nourished and well developed.  Neurologic: Alert, oriented to person, place, and time.  Gait normal.  Cranial nerves II-XII grossly intact.  HEENT: Normocephalic, atraumatic.  Extraocular muscles intact, pupils equal and reactive x2.    Teeth: Normal hygiene.  Neck: Supple, no adenopathy, thyroid normal size, non-tender, without nodularity, trachea midline.  Breasts: No masses, skin dimpling, skin retraction, nipple discharge, or asymmetry bilaterally.  Lungs: Normal respiratory effort.  Clear to auscultation bilaterally.  No wheezes, rhonci, or rales.  Heart: Regular rate and rhythm.  No murmer, rub or gallop.  Abdomen: Soft, non-tender, non-distended,no masses, no hepatosplenomegaly, no hernia.  Skin: No rash, no lesions.  Extremities: No cyanosis, clubbing or edema.  PELVIC EXAM:  Patient has no symptoms. Pelvic exam deferred. Current on her Pap.       Preliminary Dating U/S today: 1 fetus; intrauterine pregnancy, gestational sac normal  appearance. Yolk sac and fetal pole visualized.  bpm.  CRL 46.6mm EGA 11w3d  Determined by U/S CRL on 10/22/19 with EVER 2020 Cervical Length 4.7cm. Corpus lutuem cyst on the right ovary.     IMPRESSION:  Alisa Coffey is a 24 y.o.  at 11w3d for a new prenatal visit.    PLAN:  1.  IUP at 11w3d  - Options counseling performed and patient desires continuation of pregnancy to term   - Prenatal labs ordered  - Genetic testing including cystic fibrosis was discussed and patient. Pt is possibly interested.  - Continue prenatal vitamins  - Weight gain counseling performed.   - BMI <18.5: Recommend 28-40 lb weight gain   - BMI 18.5-24.9: 25-35 lb   - BMI 25-29.9: 15-25 lb   - BMI >30: 11-20 lb  - Called pt regarding Flu vaccine & Genetic Testing. Pt will come in for a nurse visit for flu vaccine.   - Return to clinic in 4 weeks for return prenatal visit     Diagnosis Plan   1. 11 weeks gestation of pregnancy     2. Nausea and vomiting in pregnancy  vitamin B-6 (PYRIDOXINE) 25 MG tablet   3. Primigravida in first trimester     4. Asthma affecting pregnancy in first trimester       Callie Johnson, APRN  10/25/2019  3:48 PM

## 2019-10-22 NOTE — PROGRESS NOTES
I spent approximately 60 minutes with the patient acquiring the health and history intake and discussing topics related to healthy lifestyle. This is her first pregnancy. A newob bag is given. The 1st trimester teaching was done with the patient. We discussed a healthy diet and exercise and what is recommended. I also discussed Listeriosis and Toxoplasmosis and what fish to avoid due to high mercury levels. Informed patient not to be in hot tubs, saunas, or tanning beds. We discussed that spotting may occur after intercourse which is common, but if heavy bleeding like a period occurs to call [facility] or hospital if clinic is closed.  I encouraged her to make an appointment with the dentist if she has not had a dental exam and cleaning in the last 6 months. The patient denies use of alcohol, tobacco, and drug use. We discussed the hospital policy procedure if a patient has a positive urine drug screen at the time of admission to the hospital. She plans to breastfeed. I gave her pamphlet on breastfeeding classes and the breastfeeding mothers support group that is provided by Carli Holden, Lactation Consultant. I informed patient that group prenatal education classes are offered here. I instructed patient to let the provider know if she would like to join a group after EDC is established.  I discussed with the patient that a pediatrician needs to be chosen prior to delivery for the infant to have an appointment scheduled before leaving the hospital.   I discussed lab tests will be done today. She had a dating ultrasound today.  I encouraged the patient to get the TDAP vaccine in the 3rd trimester. I told the patient that health departments are giving the TDAP vaccine to family members. All questions were answered at this time.

## 2019-10-23 LAB
BACTERIA SPEC AEROBE CULT: NO GROWTH
BILIRUB UR QL STRIP: NEGATIVE
C TRACH RRNA CVX QL NAA+PROBE: NEGATIVE
CLARITY UR: CLEAR
COLOR UR: YELLOW
GLUCOSE UR STRIP-MCNC: NEGATIVE MG/DL
HGB UR QL STRIP.AUTO: NEGATIVE
KETONES UR QL STRIP: NEGATIVE
LEUKOCYTE ESTERASE UR QL STRIP.AUTO: NEGATIVE
N GONORRHOEA RRNA SPEC QL NAA+PROBE: NEGATIVE
NITRITE UR QL STRIP: NEGATIVE
PH UR STRIP.AUTO: 7 [PH] (ref 5–8)
PROT UR QL STRIP: NEGATIVE
RPR SER QL: NORMAL
RUBV IGG SERPL IA-ACNC: POSITIVE
SP GR UR STRIP: 1.01 (ref 1–1.03)
TRICHOMONAS VAGINALIS PCR: NEGATIVE
UROBILINOGEN UR QL STRIP: NORMAL

## 2019-11-07 DIAGNOSIS — O99.511 RESPIRATORY SYSTEM DISEASE COMPLICATING PREGNANCY IN FIRST TRIMESTER: Primary | ICD-10-CM

## 2019-11-07 DIAGNOSIS — Z3A.11 11 WEEKS GESTATION OF PREGNANCY: ICD-10-CM

## 2019-11-07 DIAGNOSIS — Z36.87 UNSURE OF LMP (LAST MENSTRUAL PERIOD) AS REASON FOR ULTRASOUND SCAN: ICD-10-CM

## 2019-11-08 DIAGNOSIS — Z3A.11 11 WEEKS GESTATION OF PREGNANCY: ICD-10-CM

## 2019-11-08 DIAGNOSIS — O99.511 RESPIRATORY SYSTEM DISEASE COMPLICATING PREGNANCY IN FIRST TRIMESTER: ICD-10-CM

## 2019-11-08 DIAGNOSIS — Z36.87 UNSURE OF LMP (LAST MENSTRUAL PERIOD) AS REASON FOR ULTRASOUND SCAN: ICD-10-CM

## 2019-11-18 ENCOUNTER — ROUTINE PRENATAL (OUTPATIENT)
Dept: OBSTETRICS AND GYNECOLOGY | Facility: CLINIC | Age: 24
End: 2019-11-18

## 2019-11-18 VITALS — WEIGHT: 119.2 LBS | DIASTOLIC BLOOD PRESSURE: 68 MMHG | BODY MASS INDEX: 21.8 KG/M2 | SYSTOLIC BLOOD PRESSURE: 102 MMHG

## 2019-11-18 DIAGNOSIS — L30.9 ECZEMA, UNSPECIFIED TYPE: ICD-10-CM

## 2019-11-18 DIAGNOSIS — O21.9 NAUSEA AND VOMITING IN PREGNANCY: ICD-10-CM

## 2019-11-18 DIAGNOSIS — Z3A.15 15 WEEKS GESTATION OF PREGNANCY: ICD-10-CM

## 2019-11-18 DIAGNOSIS — O99.511 ASTHMA AFFECTING PREGNANCY IN FIRST TRIMESTER: ICD-10-CM

## 2019-11-18 DIAGNOSIS — Z36.3 ENCOUNTER FOR ANTENATAL SCREENING FOR MALFORMATION USING ULTRASOUND: Primary | ICD-10-CM

## 2019-11-18 DIAGNOSIS — J45.909 ASTHMA AFFECTING PREGNANCY IN FIRST TRIMESTER: ICD-10-CM

## 2019-11-18 DIAGNOSIS — Z34.01 PRIMIGRAVIDA IN FIRST TRIMESTER: ICD-10-CM

## 2019-11-18 PROCEDURE — 0502F SUBSEQUENT PRENATAL CARE: CPT | Performed by: STUDENT IN AN ORGANIZED HEALTH CARE EDUCATION/TRAINING PROGRAM

## 2019-11-18 RX ORDER — TRIAMCINOLONE ACETONIDE 5 MG/G
CREAM TOPICAL 2 TIMES DAILY
Qty: 15 G | Refills: 1 | Status: SHIPPED | OUTPATIENT
Start: 2019-11-18 | End: 2020-03-09 | Stop reason: ALTCHOICE

## 2019-11-18 NOTE — PROGRESS NOTES
I have seen the patient.  I have reviewed the notes, assessments, and/or procedures performed by Roopa Marcelo MD during the office visit.  I concur with her/his documentation and assessment and plan for Alisa Coffey.      Patient reports increase in her eczema, denies use of new detergents, lotions, or body washes.  States at one timer her dermatologist gave her a cream to use that really helped.  Kenalog ointment prescribed, also recommended she use Aquaphor.       This document has been electronically signed by Tamiko Pulido MD on November 18, 2019 4:20 PM

## 2019-11-18 NOTE — PROGRESS NOTES
CC: Prenatal visit    Alisa Coffey is a 24 y.o.  at 15w2d.  Doing well.  No complaints.  Denies contractions, LOF, or VB.  Reports good FM.    /68   Wt 54.1 kg (119 lb 3.2 oz)   LMP 2019   BMI 21.80 kg/m²   SVE: Deferred      Fetal Heart Rate: 154    10/19 Problems (from 10/22/19 to present)     Problem Noted Resolved    Nausea and vomiting in pregnancy 10/22/2019 by Callie Lawson APRN No    Overview Signed 10/22/2019  3:46 PM by Callie Lawson APRN     Pyridoxine ordered 10/22/19         Primigravida in first trimester 10/22/2019 by Callie Lawson APRN No    Overview Addendum 10/25/2019  3:41 PM by Callie Lawson APRN     A neg/ neg antibody/ Rubella Immune/GBS unk  Dating: EVER 2020 by CRL (10/22/19)  Genetics: Offer at next visit  Tdap: @ 28 wks  Flu: Offer at next visit  Anatomy: @ 20 wks  1h Glucola: @ 28 wk  H&H/Plts: @ 28  Hgb/Hct/Plt: 13.5/38.8/223  Breast/Bottle:?          Asthma affecting pregnancy in first trimester 10/22/2019 by Callie Lawson APRN No    Overview Signed 10/25/2019  3:40 PM by Callie Lawson APRN     Albuterol inhaler PRN.                A/P: Alisa Coffey is a 24 y.o.  at 15w2d.  - RTC in 4 weeks  - Scheduled anatomy ultrasound for next visit   - Patient has a component of eczema, will prescribe triamcinolone and recommended she use aquaphor      Diagnosis Plan   1. Encounter for  screening for malformation using ultrasound  US Ob 14 + Weeks Single or First Gestation   2. Nausea and vomiting in pregnancy     3. Asthma affecting pregnancy in first trimester     4. Primigravida in first trimester     5. 15 weeks gestation of pregnancy     6. Eczema, unspecified type         Roopa Marcelo MD  2019  4:07 PM

## 2019-12-16 ENCOUNTER — ROUTINE PRENATAL (OUTPATIENT)
Dept: OBSTETRICS AND GYNECOLOGY | Facility: CLINIC | Age: 24
End: 2019-12-16

## 2019-12-16 VITALS — WEIGHT: 122 LBS | SYSTOLIC BLOOD PRESSURE: 108 MMHG | BODY MASS INDEX: 22.31 KG/M2 | DIASTOLIC BLOOD PRESSURE: 62 MMHG

## 2019-12-16 DIAGNOSIS — J45.909 ASTHMA AFFECTING PREGNANCY IN FIRST TRIMESTER: ICD-10-CM

## 2019-12-16 DIAGNOSIS — Z34.01 PRIMIGRAVIDA IN FIRST TRIMESTER: ICD-10-CM

## 2019-12-16 DIAGNOSIS — Z3A.19 19 WEEKS GESTATION OF PREGNANCY: Primary | ICD-10-CM

## 2019-12-16 DIAGNOSIS — O21.9 NAUSEA AND VOMITING IN PREGNANCY: ICD-10-CM

## 2019-12-16 DIAGNOSIS — O99.511 ASTHMA AFFECTING PREGNANCY IN FIRST TRIMESTER: ICD-10-CM

## 2019-12-16 PROCEDURE — 0502F SUBSEQUENT PRENATAL CARE: CPT | Performed by: NURSE PRACTITIONER

## 2019-12-16 NOTE — PROGRESS NOTES
CC: Prenatal visit    Alisa Coffey is a 24 y.o.  at 19w2d.  Doing well.  No complaints.  Denies contractions, LOF, or VB.  Reports feeling ocassional FM.    /62   Wt 55.3 kg (122 lb)   LMP 2019   BMI 22.31 kg/m²        Fetal Heart Rate: 146 us     Reviewed prelim anatomy scan report with pt- placenta anterior, no previa, AFV WNL, all anatomy seen with normal appearance, 3vc, CL 3.73cm, gender in envelope     10/19 Problems (from 10/22/19 to present)     Problem Noted Resolved    Nausea and vomiting in pregnancy 10/22/2019 by Callie Lawson APRN No    Overview Signed 10/22/2019  3:46 PM by Callie Lawson APRN     Pyridoxine ordered 10/22/19         Primigravida in first trimester 10/22/2019 by Callie Lawson APRN No    Overview Addendum 10/25/2019  3:41 PM by Callie Lawson APRN     A neg/ neg antibody/ Rubella Immune/GBS unk  Dating: EVER 2020 by BEN (10/22/19)  Genetics: Offer at next visit  Tdap: @ 28 wks  Flu: Offer at next visit  Anatomy: @ 20 wks  1h Glucola: @ 28 wk  H&H/Plts: @ 28  Hgb/Hct/Plt: 13.5/38.8/223  Breast/Bottle:?          Asthma affecting pregnancy in first trimester 10/22/2019 by Callie Lawson APRN No    Overview Signed 10/25/2019  3:40 PM by Callie Lawson APRN     Albuterol inhaler PRN.                A/P: Alisa Coffey is a 24 y.o.  at 19w2d.  - RTC in 4 weeks for SHOAIB appt or sooner if needed      Diagnosis Plan   1. 19 weeks gestation of pregnancy     2. Nausea and vomiting in pregnancy     3. Asthma affecting pregnancy in first trimester     4. Primigravida in first trimester  Needs rhogam at 28 weeks       BENNY Mcdaniels  2019  2:54 PM

## 2019-12-17 DIAGNOSIS — Z03.79 OTHER SUSPECTED MATERNAL AND FETAL CONDITION NOT FOUND: Primary | ICD-10-CM

## 2019-12-23 DIAGNOSIS — Z36.3 ENCOUNTER FOR ANTENATAL SCREENING FOR MALFORMATION USING ULTRASOUND: ICD-10-CM

## 2020-01-14 ENCOUNTER — ROUTINE PRENATAL (OUTPATIENT)
Dept: OBSTETRICS AND GYNECOLOGY | Facility: CLINIC | Age: 25
End: 2020-01-14

## 2020-01-14 VITALS — DIASTOLIC BLOOD PRESSURE: 72 MMHG | BODY MASS INDEX: 23.59 KG/M2 | SYSTOLIC BLOOD PRESSURE: 110 MMHG | WEIGHT: 129 LBS

## 2020-01-14 DIAGNOSIS — J45.909 ASTHMA AFFECTING PREGNANCY IN FIRST TRIMESTER: ICD-10-CM

## 2020-01-14 DIAGNOSIS — O21.9 NAUSEA AND VOMITING IN PREGNANCY: ICD-10-CM

## 2020-01-14 DIAGNOSIS — Z3A.23 23 WEEKS GESTATION OF PREGNANCY: Primary | ICD-10-CM

## 2020-01-14 DIAGNOSIS — O99.511 ASTHMA AFFECTING PREGNANCY IN FIRST TRIMESTER: ICD-10-CM

## 2020-01-14 DIAGNOSIS — O26.892 RH NEGATIVE STATUS DURING PREGNANCY IN SECOND TRIMESTER: ICD-10-CM

## 2020-01-14 DIAGNOSIS — Z34.02 ENCOUNTER FOR SUPERVISION OF NORMAL FIRST PREGNANCY IN SECOND TRIMESTER: ICD-10-CM

## 2020-01-14 DIAGNOSIS — Z67.91 RH NEGATIVE STATUS DURING PREGNANCY IN SECOND TRIMESTER: ICD-10-CM

## 2020-01-14 PROCEDURE — 0502F SUBSEQUENT PRENATAL CARE: CPT | Performed by: FAMILY MEDICINE

## 2020-01-14 NOTE — PROGRESS NOTES
CC: Prenatal visit    Alisa Coffey is a 24 y.o.  at 23w3d.  Doing well.  No complaints.  Denies contractions, LOF, or VB.  Reports good FM.    F/U anatomy: cephalic, ant placenta, ARCELIA wnl, FHR 145bpm, EFW 638g (1lb7oz), all previous sub-opt views seen and normal on today's exam. CL 3.14cm.    /72   Wt 58.5 kg (129 lb)   LMP 2019   BMI 23.59 kg/m²   SVE: deferred  Fundal Height (cm): 22 cm  Fetal Heart Rate: 145 US    10/19 Problems (from 10/22/19 to present)     Problem Noted Resolved    Rh negative status during pregnancy in second trimester 2020 by Tamiko Pulido MD No    Nausea and vomiting in pregnancy 10/22/2019 by Callie Lawson APRN No    Overview Signed 10/22/2019  3:46 PM by Callie Lawson APRN     Pyridoxine ordered 10/22/19         Encounter for supervision of normal first pregnancy in second trimester 10/22/2019 by Callie Lawson APRN No    Overview Addendum 2020  7:37 AM by Tamiko Pulido MD     A neg/ neg antibody/ Rubella Immune/GBS unk  Dating: EVER 2020 by CRL (10/22/19)  Genetics: declined  Tdap: @ 28 wks  RhoGAM: 28wks  Flu: Offer at next visit  Anatomy: suboptimal cardiac views; anterior placenta  1h Glucola: @ 28 wk  Lab Results   Component Value Date    HGB 13.5 10/22/2019    HCT 38.8 10/22/2019     10/22/2019   Breastfeed  BC plan: undecided         Asthma affecting pregnancy in first trimester 10/22/2019 by Callie Lawson APRN No    Overview Signed 10/25/2019  3:40 PM by Callie Lawson APRN     Albuterol inhaler PRN.                A/P: Alisa Coffey is a 24 y.o.  at 23w3d.  - RTC in 4 weeks with 3T labs  - Encouraged to eat snacks throughout the day.     Diagnosis Plan   1. Rh negative status during pregnancy in second trimester     2. Nausea and vomiting in pregnancy     3. Asthma affecting pregnancy in first trimester     4. Encounter for supervision of normal first pregnancy in second  trimester         Signature  Tamiko Pulido MD  [facility]'s [address], [place] [address]  Office: [phone]      This document has been electronically signed by Tamiko Pulido MD on January 14, 2020 3:00 PM

## 2020-01-20 DIAGNOSIS — Z03.79 OTHER SUSPECTED MATERNAL AND FETAL CONDITION NOT FOUND: ICD-10-CM

## 2020-01-29 ENCOUNTER — OFFICE VISIT (OUTPATIENT)
Dept: FAMILY MEDICINE CLINIC | Facility: CLINIC | Age: 25
End: 2020-01-29

## 2020-01-29 VITALS
WEIGHT: 131.8 LBS | BODY MASS INDEX: 24.25 KG/M2 | HEIGHT: 62 IN | SYSTOLIC BLOOD PRESSURE: 104 MMHG | DIASTOLIC BLOOD PRESSURE: 60 MMHG | OXYGEN SATURATION: 100 % | HEART RATE: 94 BPM | TEMPERATURE: 98.3 F

## 2020-01-29 DIAGNOSIS — L30.9 ECZEMA, UNSPECIFIED TYPE: Primary | ICD-10-CM

## 2020-01-29 PROCEDURE — 99213 OFFICE O/P EST LOW 20 MIN: CPT | Performed by: FAMILY MEDICINE

## 2020-01-29 RX ORDER — PRENATAL VIT NO.126/IRON/FOLIC 28MG-0.8MG
TABLET ORAL DAILY
COMMUNITY

## 2020-01-29 NOTE — PROGRESS NOTES
" Subjective   Alisa Coffey is a 24 y.o. female.     History of Present Illness     Pregnant.  Feels like catching a cold.  Wants to make sure not strep, etc.   Also, wants triamcinolone 0.1% oint for eczema.      Review of Systems   Constitutional: Negative for chills, fatigue and fever.   HENT: Positive for congestion, ear pain, sneezing and sore throat. Negative for ear discharge, facial swelling, hearing loss, postnasal drip, rhinorrhea, sinus pressure, trouble swallowing and voice change.    Eyes: Negative for discharge, redness and visual disturbance.   Respiratory: Positive for cough and wheezing. Negative for chest tightness and shortness of breath.    Cardiovascular: Negative for chest pain and palpitations.   Gastrointestinal: Negative for abdominal pain, blood in stool, constipation, diarrhea, nausea and vomiting.   Endocrine: Positive for polydipsia. Negative for polyuria.   Genitourinary: Negative for dysuria, flank pain, hematuria and urgency.   Musculoskeletal: Negative for arthralgias, back pain, joint swelling and myalgias.   Skin: Positive for rash.   Neurological: Negative for dizziness, weakness, numbness and headaches.   Hematological: Negative for adenopathy.   Psychiatric/Behavioral: Negative for confusion and sleep disturbance. The patient is not nervous/anxious.            /60 (BP Location: Left arm, Patient Position: Sitting, Cuff Size: Adult)   Pulse 94   Temp 98.3 °F (36.8 °C) (Temporal)   Ht 157.5 cm (62.01\")   Wt 59.8 kg (131 lb 12.8 oz)   LMP 07/11/2019   SpO2 100%   BMI 24.10 kg/m²       Objective     Physical Exam   Constitutional: She is oriented to person, place, and time. She appears well-developed and well-nourished.   HENT:   Head: Normocephalic and atraumatic.   Right Ear: External ear normal.   Left Ear: External ear normal.   Nose: Nose normal.   Mouth/Throat: Oropharynx is clear and moist.   Eyes: Pupils are equal, round, and reactive to light. " Conjunctivae and EOM are normal.   Neck: Normal range of motion. Neck supple.   Cardiovascular: Normal rate, regular rhythm and normal heart sounds. Exam reveals no gallop and no friction rub.   No murmur heard.  Pulmonary/Chest: Effort normal and breath sounds normal.   Abdominal: Soft. Bowel sounds are normal. She exhibits no distension. There is no tenderness. There is no rebound and no guarding.   Musculoskeletal: Normal range of motion. She exhibits no edema or deformity.   Neurological: She is alert and oriented to person, place, and time. No cranial nerve deficit.   Skin: Skin is warm and dry. No rash noted. No erythema.   Psychiatric: She has a normal mood and affect. Her behavior is normal. Judgment and thought content normal.   Nursing note and vitals reviewed.          PAST MEDICAL HISTORY     Past Medical History:   Diagnosis Date   • Abdominal pain    • Acute maxillary sinusitis, unspecified    • Allergic rhinitis due to pollen    • Asthma    • Biliary dyskinesia    • Chronic cholecystitis    • Common cold    • Contact dermatitis    • Contraception    • Diarrhea    • Encounter for administrative examinations    • Exanthematous disorder    • GERD (gastroesophageal reflux disease)    • Headache    • Influenza    • Keratoconjunctivitis    • Nausea and vomiting    • Need for immunization against influenza    • Other seasonal allergic rhinitis    • Ovarian cyst    • Pain in eye    • Palpitation    • Purulent rhinitis    • Rash    • Right upper quadrant pain    • Urticaria       PAST SURGICAL HISTORY     Past Surgical History:   Procedure Laterality Date   • CHOLECYSTECTOMY  10/01/2012    Cholecystectomy, laparoscopic (with intraoperative cholangiogram. Supervision & interpretation of intraoperative cholangiogram. Chronic cholecystitis. Biliary dyskinesia. Right upper quadrant pain)   • LAPAROSCOPIC CHOLECYSTECTOMY     • WISDOM TOOTH EXTRACTION        SOCIAL HISTORY     Social History     Socioeconomic  History   • Marital status: Single     Spouse name: Not on file   • Number of children: Not on file   • Years of education: Not on file   • Highest education level: Not on file   Tobacco Use   • Smoking status: Passive Smoke Exposure - Never Smoker   • Smokeless tobacco: Never Used   Substance and Sexual Activity   • Alcohol use: Yes     Comment: rare and small amount   • Drug use: No   • Sexual activity: Yes     Partners: Male      ALLERGIES   Hydrocodone-acetaminophen   MEDICATIONS     Current Outpatient Medications   Medication Sig Dispense Refill   • omeprazole (PRILOSEC) 20 MG capsule Take 1 capsule by mouth Daily. 90 capsule 3   • Prenatal Vit-Fe Fumarate-FA (PRENATAL, CLASSIC, VITAMIN) 28-0.8 MG tablet tablet Take  by mouth Daily.     • albuterol (PROVENTIL) (2.5 MG/3ML) 0.083% nebulizer solution Take 2.5 mg by nebulization Every 4 (Four) Hours As Needed for Wheezing. 50 vial 0   • albuterol (PROVENTIL) (2.5 MG/3ML) 0.083% nebulizer solution Take 2.5 mg by nebulization Every 6 (Six) Hours As Needed for Wheezing. 90 vial 5   • albuterol sulfate HFA (VENTOLIN HFA) 108 (90 Base) MCG/ACT inhaler 2 puffs every 4 hours as needed for breathing 1 inhaler 5   • albuterol sulfate HFA (VENTOLIN HFA) 108 (90 Base) MCG/ACT inhaler 2 puffs every 4 hours as needed for breathing 1 inhaler 5   • BREO ELLIPTA 200-25 MCG/INH inhaler Inhale 1 puff Daily. 1 each 5   • cetirizine (zyrTEC) 10 MG tablet Take 1 tablet by mouth Every Night. 30 tablet 11   • fluticasone (FLONASE) 50 MCG/ACT nasal spray 2 sprays into the nostril(s) as directed by provider Daily. 1 bottle 11   • triamcinolone (KENALOG) 0.1 % ointment Apply  topically to the appropriate area as directed 2 (Two) Times a Day. 80 g 0   • triamcinolone (KENALOG) 0.5 % cream Apply  topically to the appropriate area as directed 2 (Two) Times a Day. 15 g 1   • vitamin B-6 (PYRIDOXINE) 25 MG tablet Take 1 tablet by mouth 3 (Three) Times a Day. 90 tablet 2     No current  facility-administered medications for this visit.         The following portions of the patient's history were reviewed and updated as appropriate: allergies, current medications, past family history, past medical history, past social history, past surgical history and problem list.        Assessment/Plan   Alisa was seen today for annual exam and cough.    Diagnoses and all orders for this visit:    Eczema, unspecified type    Other orders  -     triamcinolone (KENALOG) 0.1 % ointment; Apply  topically to the appropriate area as directed 2 (Two) Times a Day.    she is to ask her ob/gyn if meds are ok.    She has list of meds she can take if develops a cold.     Currently I see no evidence of any illness other than eczema on back of her neck                    No follow-ups on file.                  This document has been electronically signed by Golden Anthony MD on January 29, 2020 12:39 PM

## 2020-02-05 DIAGNOSIS — Z13.1 SCREENING FOR DIABETES MELLITUS: ICD-10-CM

## 2020-02-05 DIAGNOSIS — O26.892 RH NEGATIVE STATUS DURING PREGNANCY IN SECOND TRIMESTER: Primary | ICD-10-CM

## 2020-02-05 DIAGNOSIS — Z67.91 RH NEGATIVE STATUS DURING PREGNANCY IN SECOND TRIMESTER: Primary | ICD-10-CM

## 2020-02-10 ENCOUNTER — ROUTINE PRENATAL (OUTPATIENT)
Dept: OBSTETRICS AND GYNECOLOGY | Facility: CLINIC | Age: 25
End: 2020-02-10

## 2020-02-10 ENCOUNTER — LAB (OUTPATIENT)
Dept: LAB | Facility: HOSPITAL | Age: 25
End: 2020-02-10

## 2020-02-10 VITALS — SYSTOLIC BLOOD PRESSURE: 104 MMHG | BODY MASS INDEX: 24.94 KG/M2 | DIASTOLIC BLOOD PRESSURE: 68 MMHG | WEIGHT: 136.4 LBS

## 2020-02-10 DIAGNOSIS — O99.511 ASTHMA AFFECTING PREGNANCY IN FIRST TRIMESTER: ICD-10-CM

## 2020-02-10 DIAGNOSIS — Z67.91 RH NEGATIVE STATUS DURING PREGNANCY IN SECOND TRIMESTER: ICD-10-CM

## 2020-02-10 DIAGNOSIS — O26.892 RH NEGATIVE STATUS DURING PREGNANCY IN SECOND TRIMESTER: ICD-10-CM

## 2020-02-10 DIAGNOSIS — Z13.1 SCREENING FOR DIABETES MELLITUS: ICD-10-CM

## 2020-02-10 DIAGNOSIS — Z3A.27 27 WEEKS GESTATION OF PREGNANCY: ICD-10-CM

## 2020-02-10 DIAGNOSIS — Z34.02 ENCOUNTER FOR SUPERVISION OF NORMAL FIRST PREGNANCY IN SECOND TRIMESTER: Primary | ICD-10-CM

## 2020-02-10 DIAGNOSIS — J45.909 ASTHMA AFFECTING PREGNANCY IN FIRST TRIMESTER: ICD-10-CM

## 2020-02-10 PROBLEM — L30.9 ECZEMA: Status: RESOLVED | Noted: 2019-11-18 | Resolved: 2020-02-10

## 2020-02-10 PROBLEM — I47.1 SUPRAVENTRICULAR TACHYCARDIA (HCC): Status: RESOLVED | Noted: 2018-04-13 | Resolved: 2020-02-10

## 2020-02-10 PROBLEM — F43.9 STRESS: Status: RESOLVED | Noted: 2018-04-13 | Resolved: 2020-02-10

## 2020-02-10 LAB
BASOPHILS # BLD AUTO: 0.04 10*3/MM3 (ref 0–0.2)
BASOPHILS NFR BLD AUTO: 0.4 % (ref 0–1.5)
BLD GP AB SCN SERPL QL: NEGATIVE
DEPRECATED RDW RBC AUTO: 41.2 FL (ref 37–54)
EOSINOPHIL # BLD AUTO: 0.12 10*3/MM3 (ref 0–0.4)
EOSINOPHIL NFR BLD AUTO: 1.1 % (ref 0.3–6.2)
ERYTHROCYTE [DISTWIDTH] IN BLOOD BY AUTOMATED COUNT: 11.8 % (ref 12.3–15.4)
GLUCOSE 1H P 100 G GLC PO SERPL-MCNC: 121 MG/DL (ref 60–140)
HCT VFR BLD AUTO: 34.9 % (ref 34–46.6)
HGB BLD-MCNC: 12.1 G/DL (ref 12–15.9)
IMM GRANULOCYTES # BLD AUTO: 0.04 10*3/MM3 (ref 0–0.05)
IMM GRANULOCYTES NFR BLD AUTO: 0.4 % (ref 0–0.5)
LYMPHOCYTES # BLD AUTO: 1.88 10*3/MM3 (ref 0.7–3.1)
LYMPHOCYTES NFR BLD AUTO: 16.7 % (ref 19.6–45.3)
MCH RBC QN AUTO: 33.3 PG (ref 26.6–33)
MCHC RBC AUTO-ENTMCNC: 34.7 G/DL (ref 31.5–35.7)
MCV RBC AUTO: 96.1 FL (ref 79–97)
MONOCYTES # BLD AUTO: 0.6 10*3/MM3 (ref 0.1–0.9)
MONOCYTES NFR BLD AUTO: 5.3 % (ref 5–12)
NEUTROPHILS # BLD AUTO: 8.58 10*3/MM3 (ref 1.7–7)
NEUTROPHILS NFR BLD AUTO: 76.1 % (ref 42.7–76)
NRBC BLD AUTO-RTO: 0 /100 WBC (ref 0–0.2)
PLATELET # BLD AUTO: 269 10*3/MM3 (ref 140–450)
PMV BLD AUTO: 10.6 FL (ref 6–12)
RBC # BLD AUTO: 3.63 10*6/MM3 (ref 3.77–5.28)
WBC NRBC COR # BLD: 11.26 10*3/MM3 (ref 3.4–10.8)

## 2020-02-10 PROCEDURE — 90715 TDAP VACCINE 7 YRS/> IM: CPT | Performed by: OBSTETRICS & GYNECOLOGY

## 2020-02-10 PROCEDURE — 86850 RBC ANTIBODY SCREEN: CPT

## 2020-02-10 PROCEDURE — 85025 COMPLETE CBC W/AUTO DIFF WBC: CPT

## 2020-02-10 PROCEDURE — 96372 THER/PROPH/DIAG INJ SC/IM: CPT | Performed by: OBSTETRICS & GYNECOLOGY

## 2020-02-10 PROCEDURE — 36415 COLL VENOUS BLD VENIPUNCTURE: CPT

## 2020-02-10 PROCEDURE — 0502F SUBSEQUENT PRENATAL CARE: CPT | Performed by: OBSTETRICS & GYNECOLOGY

## 2020-02-10 PROCEDURE — 82950 GLUCOSE TEST: CPT

## 2020-02-10 PROCEDURE — 90471 IMMUNIZATION ADMIN: CPT | Performed by: OBSTETRICS & GYNECOLOGY

## 2020-02-10 NOTE — PROGRESS NOTES
"CC: Prenatal visit    Alisa Coffey is a 24 y.o.  at 27w2d.  Doing well.  No complaints.  Denies contractions, LOF, or VB.  Reports good FM.  She is interested in CenteringPregnancy.  She is interested in delayed cord clamping and \"the de la rosa hour.\"  Her family doctor started her on triamcinolone for her eczema.    /68   Wt 61.9 kg (136 lb 6.4 oz)   LMP 2019   BMI 24.94 kg/m²   Fundal Height (cm): 27 cm  Fetal Heart Rate: 140    10/19 Problems (from 10/22/19 to present)     Problem Noted Resolved    Rh negative status during pregnancy in second trimester 2020 by Tamiko Pulido MD No    Overview Signed 2/10/2020 11:03 AM by Kerline Caro MD     RhoGAM on 2/10         Encounter for supervision of normal first pregnancy in second trimester 10/22/2019 by Callie Lawson APRN No    Overview Addendum 2/10/2020 11:04 AM by Kerline Caro MD     A neg/ neg antibody/ Rubella Immune/GBS unk  Dating: EVER 2020 by BEN (10/22/19)  Genetics: declined  Tdap: 2/10  Flu: Declines  Anatomy: rpt wnl; anterior placenta; male fetus  1h Glucola: @ 28 wk  Lab Results   Component Value Date    HGB 13.5 10/22/2019    HCT 38.8 10/22/2019     10/22/2019   Breastfeed  BC plan: undecided         Asthma affecting pregnancy in first trimester 10/22/2019 by Callie Lawson APRN No    Overview Signed 10/25/2019  3:40 PM by Callie Lawson APRN     Albuterol inhaler PRN.              A/P: Alisa Coffey is a 24 y.o.  at 27w2d.  - RTC in 2 weeks for RPN/Centering  - 3T labs today  - Tdap today  - RhoGAM today  - Discussed delayed cord clamping.  Discussed that most benefit between 30-60 seconds as long as the baby is in no acute distress.  Reviewed what \"the de la rosa hour\" entails.  - Okay to take steroid cream in pregnancy     Diagnosis Plan   1. Encounter for supervision of normal first pregnancy in second trimester     2. Rh negative status " during pregnancy in second trimester     3. Asthma affecting pregnancy in first trimester     4. 27 weeks gestation of pregnancy       Kerline Caro MD  2/10/2020  11:04 AM

## 2020-02-24 ENCOUNTER — ROUTINE PRENATAL (OUTPATIENT)
Dept: OBSTETRICS AND GYNECOLOGY | Facility: CLINIC | Age: 25
End: 2020-02-24

## 2020-02-24 VITALS — DIASTOLIC BLOOD PRESSURE: 76 MMHG | BODY MASS INDEX: 24.17 KG/M2 | WEIGHT: 132.2 LBS | SYSTOLIC BLOOD PRESSURE: 118 MMHG

## 2020-02-24 DIAGNOSIS — Z3A.29 29 WEEKS GESTATION OF PREGNANCY: Primary | ICD-10-CM

## 2020-02-24 DIAGNOSIS — O99.511 ASTHMA AFFECTING PREGNANCY IN FIRST TRIMESTER: ICD-10-CM

## 2020-02-24 DIAGNOSIS — Z34.03 ENCOUNTER FOR SUPERVISION OF NORMAL FIRST PREGNANCY IN THIRD TRIMESTER: ICD-10-CM

## 2020-02-24 DIAGNOSIS — Z67.91 RH NEGATIVE STATUS DURING PREGNANCY IN SECOND TRIMESTER: ICD-10-CM

## 2020-02-24 DIAGNOSIS — J45.909 ASTHMA AFFECTING PREGNANCY IN FIRST TRIMESTER: ICD-10-CM

## 2020-02-24 DIAGNOSIS — O26.892 RH NEGATIVE STATUS DURING PREGNANCY IN SECOND TRIMESTER: ICD-10-CM

## 2020-02-24 PROCEDURE — 0502F SUBSEQUENT PRENATAL CARE: CPT | Performed by: FAMILY MEDICINE

## 2020-02-24 NOTE — PROGRESS NOTES
CC: Prenatal visit/OB Centering    Alisa Coffey is a 24 y.o.  at 29w2d.  Doing well.  No complaints.  Denies contractions, LOF, or VB.  Reports good FM.    /76   Wt 60 kg (132 lb 3.2 oz)   LMP 2019   BMI 24.17 kg/m²   SVE: deferred  Fundal Height (cm): 32 cm  Fetal Heart Rate: 135    10/19 Problems (from 10/22/19 to present)     Problem Noted Resolved    Rh negative status during pregnancy in second trimester 2020 by Tamiko Pulido MD No    Overview Signed 2/10/2020 11:03 AM by Kerline Caro MD     RhoGAM on 2/10         Encounter for supervision of normal first pregnancy in second trimester 10/22/2019 by Callie Lawson APRN No    Overview Addendum 2/10/2020 12:11 PM by Tamiko Pulido MD     A neg/ neg antibody/ Rubella Immune/GBS unk  Dating: EVER 2020 by CRL (10/22/19)  Genetics: declined  Tdap: 2/10/20  Flu: Declines  Anatomy: rpt wnl; anterior placenta; male fetus  1h Glucola: 121  Lab Results   Component Value Date    HGB 13.5 10/22/2019    HCT 38.8 10/22/2019     10/22/2019   Breastfeed  BC plan: undecided         Asthma affecting pregnancy in first trimester 10/22/2019 by Callie Lawson APRN No    Overview Signed 10/25/2019  3:40 PM by Callie Lawson APRN     Albuterol inhaler PRN.                A/P: Alisa Coffey is a 24 y.o.  at 29w2d.  - RTC in 2 weeks as scheduled for OB centering  - reviewed in detail the labor process; signs/symptoms of labor; vaginal delivery reviewed; labor analgesia reviewed.  - tour of L&D completed  - FKC reviewed; PTL precautions given     Diagnosis Plan   1. 29 weeks gestation of pregnancy     2. Rh negative status during pregnancy in second trimester     3. Asthma affecting pregnancy in first trimester     4. Encounter for supervision of normal first pregnancy in third trimester         Signature  Tamiko Pulido MD  [facility]'s [address] [address]  [place] [address]  Office: [phone]      This document has been electronically signed by Tamiko Pulido MD on February 24, 2020 10:02 AM

## 2020-03-09 ENCOUNTER — ROUTINE PRENATAL (OUTPATIENT)
Dept: OBSTETRICS AND GYNECOLOGY | Facility: CLINIC | Age: 25
End: 2020-03-09

## 2020-03-09 VITALS — DIASTOLIC BLOOD PRESSURE: 68 MMHG | BODY MASS INDEX: 25.64 KG/M2 | WEIGHT: 140.2 LBS | SYSTOLIC BLOOD PRESSURE: 120 MMHG

## 2020-03-09 DIAGNOSIS — O99.511 ASTHMA AFFECTING PREGNANCY IN FIRST TRIMESTER: ICD-10-CM

## 2020-03-09 DIAGNOSIS — Z67.91 RH NEGATIVE STATUS DURING PREGNANCY IN SECOND TRIMESTER: ICD-10-CM

## 2020-03-09 DIAGNOSIS — Z3A.31 31 WEEKS GESTATION OF PREGNANCY: Primary | ICD-10-CM

## 2020-03-09 DIAGNOSIS — Z34.03 ENCOUNTER FOR SUPERVISION OF NORMAL FIRST PREGNANCY IN THIRD TRIMESTER: ICD-10-CM

## 2020-03-09 DIAGNOSIS — J45.909 ASTHMA AFFECTING PREGNANCY IN FIRST TRIMESTER: ICD-10-CM

## 2020-03-09 DIAGNOSIS — O26.892 RH NEGATIVE STATUS DURING PREGNANCY IN SECOND TRIMESTER: ICD-10-CM

## 2020-03-09 PROCEDURE — 0502F SUBSEQUENT PRENATAL CARE: CPT | Performed by: FAMILY MEDICINE

## 2020-03-09 NOTE — PROGRESS NOTES
CC: Prenatal visit    Alisa Coffey is a 24 y.o.  at 31w2d.  Doing well.  Denies contractions, LOF, or VB.  Reports good FM.     /68   Wt 63.6 kg (140 lb 3.2 oz)   LMP 2019   BMI 25.64 kg/m²   SVE: deferred  Fundal Height (cm): 32 cm  Fetal Heart Rate: 138    10/19 Problems (from 10/22/19 to present)     Problem Noted Resolved    Rh negative status during pregnancy in second trimester 2020 by Tamiko Pulido MD No    Overview Signed 2/10/2020 11:03 AM by Kerline Caro MD     RhoGAM on 2/10         Encounter for supervision of normal first pregnancy in third trimester 10/22/2019 by Callie Lawson APRN No    Overview Addendum 2/10/2020 12:11 PM by Tamiko Pulido MD     A neg/ neg antibody/ Rubella Immune/GBS unk  Dating: EVER 2020 by BEN (10/22/19)  Genetics: declined  Tdap: 2/10/20  Flu: Declines  Anatomy: rpt wnl; anterior placenta; male fetus  1h Glucola: 121  Lab Results   Component Value Date    HGB 13.5 10/22/2019    HCT 38.8 10/22/2019     10/22/2019   Breastfeed  BC plan: undecided         Asthma affecting pregnancy in first trimester 10/22/2019 by Callie Lawson APRN No    Overview Signed 10/25/2019  3:40 PM by Callie Lawson APRN     Albuterol inhaler PRN.                A/P: Alisa Coffey is a 24 y.o.  at 31w2d.  - RTC in 2 weeks for OB centering  - Reviewed Epidural R/B/A, discussed operative vaginal delivery; indications for emergent  delivery.    - FKC reviewed, PTL precautions given.     Diagnosis Plan   1. 31 weeks gestation of pregnancy     2. Rh negative status during pregnancy in second trimester     3. Asthma affecting pregnancy in first trimester     4. Encounter for supervision of normal first pregnancy in third trimester         Signature  Tamiko Pulido MD  [facility]'s [address] [address]  Office: [phone]      This  document has been electronically signed by Tamiko Pulido MD on March 9, 2020 9:16 AM

## 2020-03-17 ENCOUNTER — TELEPHONE (OUTPATIENT)
Dept: OBSTETRICS AND GYNECOLOGY | Facility: CLINIC | Age: 25
End: 2020-03-17

## 2020-03-17 NOTE — TELEPHONE ENCOUNTER
Patient was called and notified that due to the COVID-19 precautions, we are cancelling group prenatal care. She is due to come in for a prenatal appointment. She is told that Dr. Pulido will just see her for a one-on-one prenatal visit on March 23rd at 8:00am. Patient verbalizes understanding and is in agreement.

## 2020-03-23 ENCOUNTER — ROUTINE PRENATAL (OUTPATIENT)
Dept: OBSTETRICS AND GYNECOLOGY | Facility: CLINIC | Age: 25
End: 2020-03-23

## 2020-03-23 VITALS — WEIGHT: 142.2 LBS | SYSTOLIC BLOOD PRESSURE: 100 MMHG | BODY MASS INDEX: 26 KG/M2 | DIASTOLIC BLOOD PRESSURE: 64 MMHG

## 2020-03-23 DIAGNOSIS — O26.892 RH NEGATIVE STATUS DURING PREGNANCY IN SECOND TRIMESTER: ICD-10-CM

## 2020-03-23 DIAGNOSIS — Z67.91 RH NEGATIVE STATUS DURING PREGNANCY IN SECOND TRIMESTER: ICD-10-CM

## 2020-03-23 DIAGNOSIS — Z3A.33 33 WEEKS GESTATION OF PREGNANCY: Primary | ICD-10-CM

## 2020-03-23 DIAGNOSIS — O99.511 ASTHMA AFFECTING PREGNANCY IN FIRST TRIMESTER: ICD-10-CM

## 2020-03-23 DIAGNOSIS — J45.909 ASTHMA AFFECTING PREGNANCY IN FIRST TRIMESTER: ICD-10-CM

## 2020-03-23 DIAGNOSIS — Z34.03 ENCOUNTER FOR SUPERVISION OF NORMAL FIRST PREGNANCY IN THIRD TRIMESTER: ICD-10-CM

## 2020-03-23 PROCEDURE — 0502F SUBSEQUENT PRENATAL CARE: CPT | Performed by: FAMILY MEDICINE

## 2020-03-23 NOTE — PROGRESS NOTES
CC: Prenatal visit    Alisa Coffey is a 24 y.o.  at 33w2d.  Doing well.  No complaints.  Denies contractions, LOF, or VB.  Reports good FM.    /64   Wt 64.5 kg (142 lb 3.2 oz)   LMP 2019   BMI 26.00 kg/m²   SVE: deferred  Fundal Height (cm): 33 cm  Fetal Heart Rate: 145    10/19 Problems (from 10/22/19 to present)     Problem Noted Resolved    Rh negative status during pregnancy in second trimester 2020 by Tamiko Pulido MD No    Overview Signed 2/10/2020 11:03 AM by Kerline Caro MD     RhoGAM on 2/10         Encounter for supervision of normal first pregnancy in third trimester 10/22/2019 by Callie Lawson APRN No    Overview Addendum 2/10/2020 12:11 PM by Tamiko Pulido MD     A neg/ neg antibody/ Rubella Immune/GBS unk  Dating: EVER 2020 by BEN (10/22/19)  Genetics: declined  Tdap: 2/10/20  Flu: Declines  Anatomy: rpt wnl; anterior placenta; male fetus  1h Glucola: 121  Lab Results   Component Value Date    HGB 13.5 10/22/2019    HCT 38.8 10/22/2019     10/22/2019   Breastfeed  BC plan: undecided         Asthma affecting pregnancy in first trimester 10/22/2019 by Callie Lawson APRN No    Overview Signed 10/25/2019  3:40 PM by Callie Lawson APRN     Albuterol inhaler PRN.                A/P: Alisa Coffey is a 24 y.o.  at 33w2d.  - RTC in 2 weeks  - FKC reviewed.  PTL precautions given.  Encouraged to drink 3-4 glasses of ice water if she experiences contractions.  If contractions occur regularly (every 5-10 minutes or less) for 1 hour and do not resolve with drinking fluids and/or taking a warm bath, patient advised to come to L&D for evaluation.     Diagnosis Plan   1. 33 weeks gestation of pregnancy     2. Rh negative status during pregnancy in second trimester     3. Asthma affecting pregnancy in first trimester     4. Encounter for supervision of normal first pregnancy in third trimester        Signature  Tamiko Pulido MD  [facility]'[address], [place] [address]  Office: [phone]      This document has been electronically signed by Tamiko Pulido MD on March 23, 2020 09:09

## 2020-04-06 ENCOUNTER — ROUTINE PRENATAL (OUTPATIENT)
Dept: OBSTETRICS AND GYNECOLOGY | Facility: CLINIC | Age: 25
End: 2020-04-06

## 2020-04-06 VITALS — DIASTOLIC BLOOD PRESSURE: 76 MMHG | BODY MASS INDEX: 26.37 KG/M2 | SYSTOLIC BLOOD PRESSURE: 114 MMHG | WEIGHT: 144.2 LBS

## 2020-04-06 DIAGNOSIS — J45.909 ASTHMA AFFECTING PREGNANCY IN FIRST TRIMESTER: ICD-10-CM

## 2020-04-06 DIAGNOSIS — Z67.91 RH NEGATIVE STATUS DURING PREGNANCY IN SECOND TRIMESTER: ICD-10-CM

## 2020-04-06 DIAGNOSIS — O26.892 RH NEGATIVE STATUS DURING PREGNANCY IN SECOND TRIMESTER: ICD-10-CM

## 2020-04-06 DIAGNOSIS — Z34.03 ENCOUNTER FOR SUPERVISION OF NORMAL FIRST PREGNANCY IN THIRD TRIMESTER: ICD-10-CM

## 2020-04-06 DIAGNOSIS — O99.511 ASTHMA AFFECTING PREGNANCY IN FIRST TRIMESTER: ICD-10-CM

## 2020-04-06 DIAGNOSIS — Z3A.35 35 WEEKS GESTATION OF PREGNANCY: Primary | ICD-10-CM

## 2020-04-06 DIAGNOSIS — Z36.85 ENCOUNTER FOR ANTENATAL SCREENING FOR STREPTOCOCCUS B: ICD-10-CM

## 2020-04-06 PROCEDURE — 0502F SUBSEQUENT PRENATAL CARE: CPT | Performed by: FAMILY MEDICINE

## 2020-04-06 PROCEDURE — 87653 STREP B DNA AMP PROBE: CPT | Performed by: FAMILY MEDICINE

## 2020-04-06 NOTE — PROGRESS NOTES
CC: Prenatal visit    Alisa Coffey is a 24 y.o.  at 35w2d.  Doing well.  No complaints.  Denies contractions, LOF, or VB.  Reports good FM.  She has taken off work due to COVID-19 Pandemic risks.    /76   Wt 65.4 kg (144 lb 3.2 oz)   LMP 2019   BMI 26.37 kg/m²   SVE: deferred  Fundal Height (cm): 35 cm  Fetal Heart Rate: 145    10/19 Problems (from 10/22/19 to present)     Problem Noted Resolved    Rh negative status during pregnancy in second trimester 2020 by Tamiko Pulido MD No    Overview Signed 2/10/2020 11:03 AM by Kerline Caro MD     RhoGAM on 2/10         Encounter for supervision of normal first pregnancy in third trimester 10/22/2019 by Callie Lawson APRN No    Overview Addendum 2020  8:28 AM by Tamiko Pulido MD     A neg/ neg antibody/ Rubella Immune/GBS collected 20  Dating: EVER 2020 by BEN (10/22/19)  Genetics: declined  Tdap: 2/10/20  Flu: Declines  Anatomy: rpt wnl; anterior placenta; male fetus  1h Glucola: 121  Lab Results   Component Value Date    HGB 13.5 10/22/2019    HCT 38.8 10/22/2019     10/22/2019   Breastfeed  BC plan: undecided         Asthma affecting pregnancy in first trimester 10/22/2019 by Callie Lawson APRN No    Overview Signed 10/25/2019  3:40 PM by Callie Lawson APRN     Albuterol inhaler PRN.                A/P: Alisa Coffey is a 24 y.o.  at 35w2d.  - RTC in 1 weeks  - GBS collected today  - FKC reviewed.  PTL precautions given.  Encouraged to drink 3-4 glasses of ice water if she experiences contractions.  If contractions occur regularly (every 5-10 minutes or less) for 1 hour and do not resolve with drinking fluids and/or taking a warm bath, patient advised to come to L&D for evaluation.     Diagnosis Plan   1. 35 weeks gestation of pregnancy     2. Rh negative status during pregnancy in second trimester     3. Asthma affecting pregnancy in first trimester      4. Encounter for supervision of normal first pregnancy in third trimester     5. Encounter for  screening for Streptococcus B  Group B Strep (Molecular) - Swab, Vaginal/Rectum    Group B Strep (Molecular) - Swab, Vaginal/Rectum       Signature  Tamiko Pulido MD  [facility] [place] [address]  Office: [phone]      This document has been electronically signed by Tamiko Pulido MD on 2020 08:28

## 2020-04-07 LAB — GROUP B STREP, DNA: NEGATIVE

## 2020-04-14 ENCOUNTER — ROUTINE PRENATAL (OUTPATIENT)
Dept: OBSTETRICS AND GYNECOLOGY | Facility: CLINIC | Age: 25
End: 2020-04-14

## 2020-04-14 VITALS — DIASTOLIC BLOOD PRESSURE: 76 MMHG | SYSTOLIC BLOOD PRESSURE: 114 MMHG | BODY MASS INDEX: 26.81 KG/M2 | WEIGHT: 146.6 LBS

## 2020-04-14 DIAGNOSIS — J45.909 ASTHMA AFFECTING PREGNANCY IN FIRST TRIMESTER: ICD-10-CM

## 2020-04-14 DIAGNOSIS — R10.2 PELVIC PRESSURE IN PREGNANCY, THIRD TRIMESTER: ICD-10-CM

## 2020-04-14 DIAGNOSIS — O26.892 RH NEGATIVE STATUS DURING PREGNANCY IN SECOND TRIMESTER: ICD-10-CM

## 2020-04-14 DIAGNOSIS — Z3A.36 36 WEEKS GESTATION OF PREGNANCY: Primary | ICD-10-CM

## 2020-04-14 DIAGNOSIS — Z34.03 ENCOUNTER FOR SUPERVISION OF NORMAL FIRST PREGNANCY IN THIRD TRIMESTER: ICD-10-CM

## 2020-04-14 DIAGNOSIS — O99.511 ASTHMA AFFECTING PREGNANCY IN FIRST TRIMESTER: ICD-10-CM

## 2020-04-14 DIAGNOSIS — Z67.91 RH NEGATIVE STATUS DURING PREGNANCY IN SECOND TRIMESTER: ICD-10-CM

## 2020-04-14 DIAGNOSIS — O26.893 PELVIC PRESSURE IN PREGNANCY, THIRD TRIMESTER: ICD-10-CM

## 2020-04-14 DIAGNOSIS — R10.2 PAIN OF ROUND LIGAMENT DURING PREGNANCY: ICD-10-CM

## 2020-04-14 DIAGNOSIS — O26.899 PAIN OF ROUND LIGAMENT DURING PREGNANCY: ICD-10-CM

## 2020-04-14 PROCEDURE — 99213 OFFICE O/P EST LOW 20 MIN: CPT | Performed by: FAMILY MEDICINE

## 2020-04-14 NOTE — PROGRESS NOTES
CC: Prenatal visit    Alisa Coffey is a 24 y.o.  at 36w3d.  Doing well.  Denies contractions, LOF, or VB.  Reports good FM.  Complains of increased pelvic pressure and round ligament pain when ambulating.    /76   Wt 66.5 kg (146 lb 9.6 oz)   LMP 2019   BMI 26.81 kg/m²   SVE: deferred  Fundal Height (cm): 36 cm  Fetal Heart Rate: 155    10/19 Problems (from 10/22/19 to present)     Problem Noted Resolved    Rh negative status during pregnancy in second trimester 2020 by Tamiko Pulido MD No    Overview Signed 2/10/2020 11:03 AM by Kerline Caro MD     RhoGAM on 2/10         Encounter for supervision of normal first pregnancy in third trimester 10/22/2019 by Callie Lawson APRN No    Overview Addendum 2020 12:31 PM by Tamiko Pulido MD     A neg/ neg antibody/ Rubella Immune/GBS NEG  Dating: EVER 2020 by CRL (10/22/19)  Genetics: declined  Tdap: 2/10/20  Flu: Declines  Anatomy: rpt wnl; anterior placenta; male fetus  1h Glucola: 121  Lab Results   Component Value Date    HGB 13.5 10/22/2019    HCT 38.8 10/22/2019     10/22/2019   Breastfeed  BC plan: undecided         Asthma affecting pregnancy in first trimester 10/22/2019 by Callie Lawson APRN No    Overview Signed 10/25/2019  3:40 PM by Callie Lawson APRN     Albuterol inhaler PRN.                A/P: Alisa Coffey is a 24 y.o.  at 36w3d.  - RTC in 1 weeks  - Pregnancy support belt given  - FKC reviewed.  PTL precautions given.  Encouraged to drink 3-4 glasses of ice water if she experiences contractions.  If contractions occur regularly (every 5-10 minutes or less) for 1 hour and do not resolve with drinking fluids and/or taking a warm bath, patient advised to come to L&D for evaluation.  - COVID policy reviewed     Diagnosis Plan   1. 36 weeks gestation of pregnancy     2. Rh negative status during pregnancy in second trimester     3. Asthma affecting  pregnancy in first trimester     4. Encounter for supervision of normal first pregnancy in third trimester     5. Pelvic pressure in pregnancy, third trimester     6. Pain of round ligament during pregnancy       Delivery Counseling:   I discussed with the patient the risks, benefits, and side effects of vaginal delivery as well as the risks, benefits, and side effects of alternative procedures/treatments.  Reviewed risks for delivery including infection, bleeding (possibly requiring blood transfusion and/or additional surgery but not limited to hysterectomy), shoulder dystocia (which could cause temporary or permanent nerve injury to the baby), injury to surrounding organs (bowel, bladder, blood vessels, nerves, vagina possibly involving the rectum), future pelvic floor dysfunction, pain/discomfort, injury to the baby (laceration, hematoma, skull fracture, nerve injury and brain injury), and maternal/fetal death.  Reviewed possible operative delivery; reviewed fetal risks including cephalohematoma, intracranial hemorrhage, and skull fracture; reviewed maternal risks including the above.  Reviewed possible  section; reviewed risks including injury to surrounding organs (bowel, bladder, blood vessels, nerves, ureters, baby), bleeding (possibly requiring blood transfusion and/or hysterectomy), infection, maternal/fetal death.  The patient and family have been given opportunity to ask questions and these questions have been answered to their satisfaction.  Patient expresses understanding and desires to proceed with the procedure/treatment.       Signature  Tamiko Pulido MD  [facility]  [address] [place] [address]  Office: [phone]      This document has been electronically signed by Tamiko Pulido MD on 2020 11:18

## 2020-04-20 ENCOUNTER — ROUTINE PRENATAL (OUTPATIENT)
Dept: OBSTETRICS AND GYNECOLOGY | Facility: CLINIC | Age: 25
End: 2020-04-20

## 2020-04-20 VITALS — WEIGHT: 150 LBS | BODY MASS INDEX: 27.43 KG/M2 | DIASTOLIC BLOOD PRESSURE: 70 MMHG | SYSTOLIC BLOOD PRESSURE: 118 MMHG

## 2020-04-20 DIAGNOSIS — Z67.91 RH NEGATIVE STATUS DURING PREGNANCY IN SECOND TRIMESTER: ICD-10-CM

## 2020-04-20 DIAGNOSIS — O99.511 ASTHMA AFFECTING PREGNANCY IN FIRST TRIMESTER: ICD-10-CM

## 2020-04-20 DIAGNOSIS — Z34.03 ENCOUNTER FOR SUPERVISION OF NORMAL FIRST PREGNANCY IN THIRD TRIMESTER: ICD-10-CM

## 2020-04-20 DIAGNOSIS — O26.892 RH NEGATIVE STATUS DURING PREGNANCY IN SECOND TRIMESTER: ICD-10-CM

## 2020-04-20 DIAGNOSIS — J45.909 ASTHMA AFFECTING PREGNANCY IN FIRST TRIMESTER: ICD-10-CM

## 2020-04-20 DIAGNOSIS — Z3A.37 37 WEEKS GESTATION OF PREGNANCY: Primary | ICD-10-CM

## 2020-04-20 PROCEDURE — 99213 OFFICE O/P EST LOW 20 MIN: CPT | Performed by: STUDENT IN AN ORGANIZED HEALTH CARE EDUCATION/TRAINING PROGRAM

## 2020-04-20 NOTE — PROGRESS NOTES
I have personally seen & examined the patient. I have reviewed the notes, assessments, and/or procedures performed by Andres Kovacs MD, I concur with her/his documentation of Alisa Coffey.     FKC reviewed, labor signs/symptoms discussed. Patient knows which phone # to call if she thinks she is in labor.  RTC in 1 week    Signature  Tamiko Pulido MD  [facility]'s [address], [place] [address]  Office: [phone]      This document has been electronically signed by Tamiko Pulido MD on April 20, 2020 11:11

## 2020-04-20 NOTE — PROGRESS NOTES
"CC: Prenatal visit    Alisa Coffey is a 24 y.o.  at 37w2d.  Doing well.  No complaints.  Denies contractions, LOF, or VB.  Reports good FM. Patient reports \" feeling pretty good\"     /70   Wt 68 kg (150 lb)   LMP 2019   BMI 27.43 kg/m²   SVE: 1-2/60/-2/MID/ SOFT as per Dr Pulido's exam  Fundal Height (cm): 36 cm  Fetal Heart Rate: 130     10/19 Problems (from 10/22/19 to present)     Problem Noted Resolved    Rh negative status during pregnancy in second trimester 2020 by Tamiko Pulido MD No    Overview Signed 2/10/2020 11:03 AM by Kerline Caro MD     RhoGAM on 2/10         Encounter for supervision of normal first pregnancy in third trimester 10/22/2019 by Callie Lawson APRN No    Overview Addendum 2020 12:31 PM by Tamiko Pulido MD     A neg/ neg antibody/ Rubella Immune/GBS NEG  Dating: EVER 2020 by CRL (10/22/19)  Genetics: declined  Tdap: 2/10/20  Flu: Declines  Anatomy: rpt wnl; anterior placenta; male fetus  1h Glucola: 121  Lab Results   Component Value Date    HGB 13.5 10/22/2019    HCT 38.8 10/22/2019     10/22/2019   Breastfeed  BC plan: undecided         Asthma affecting pregnancy in first trimester 10/22/2019 by Callie Lawson APRN No    Overview Signed 10/25/2019  3:40 PM by Callie Lawson APRN     Albuterol inhaler PRN.                A/P: Alisa Coffey is a 24 y.o.  at 37w2d.  - RTC in 4 weeks  -counseled patient on FKC and labor signs, patient voiced understanding        Diagnosis Plan   1. 37 weeks gestation of pregnancy     2. Rh negative status during pregnancy in second trimester     3. Asthma affecting pregnancy in first trimester     4. Encounter for supervision of normal first pregnancy in third trimester         Andres Kovacs MD  2020  10:55    Signed,   Andres Kovacs MD  Family Medicine Resident PGY2  [facility]        This document has been " electronically signed by Andres Kovacs MD on April 20, 2020 10:55

## 2020-04-27 ENCOUNTER — ROUTINE PRENATAL (OUTPATIENT)
Dept: OBSTETRICS AND GYNECOLOGY | Facility: CLINIC | Age: 25
End: 2020-04-27

## 2020-04-27 VITALS — SYSTOLIC BLOOD PRESSURE: 120 MMHG | WEIGHT: 150 LBS | BODY MASS INDEX: 27.43 KG/M2 | DIASTOLIC BLOOD PRESSURE: 76 MMHG

## 2020-04-27 DIAGNOSIS — Z34.03 ENCOUNTER FOR SUPERVISION OF NORMAL FIRST PREGNANCY IN THIRD TRIMESTER: ICD-10-CM

## 2020-04-27 DIAGNOSIS — O26.893 RH NEGATIVE STATUS DURING PREGNANCY IN THIRD TRIMESTER: Primary | ICD-10-CM

## 2020-04-27 DIAGNOSIS — Z3A.38 38 WEEKS GESTATION OF PREGNANCY: ICD-10-CM

## 2020-04-27 DIAGNOSIS — J45.909 ASTHMA COMPLICATING PREGNANCY IN THIRD TRIMESTER: ICD-10-CM

## 2020-04-27 DIAGNOSIS — O99.513 ASTHMA COMPLICATING PREGNANCY IN THIRD TRIMESTER: ICD-10-CM

## 2020-04-27 DIAGNOSIS — O26.899 PAIN OF ROUND LIGAMENT DURING PREGNANCY: ICD-10-CM

## 2020-04-27 DIAGNOSIS — Z67.91 RH NEGATIVE STATUS DURING PREGNANCY IN THIRD TRIMESTER: Primary | ICD-10-CM

## 2020-04-27 DIAGNOSIS — R10.2 PAIN OF ROUND LIGAMENT DURING PREGNANCY: ICD-10-CM

## 2020-04-27 PROCEDURE — 99213 OFFICE O/P EST LOW 20 MIN: CPT | Performed by: NURSE PRACTITIONER

## 2020-04-27 NOTE — PROGRESS NOTES
CC: Prenatal visit    Alisa Coffey is a 24 y.o.  at 38w2d.  Doing well. Denies dysuria, vb, LOF, decreased FM, headaches, heartburn, constipation or regular contractions. Having pain in sides of pelvis with movement but improves with rest.     /76   Wt 68 kg (150 lb)   LMP 2019   BMI 27.43 kg/m²   SVE: NA  Fundal Height (cm): 37 cm  Fetal Heart Rate: 134    10/19 Problems (from 10/22/19 to present)     Problem Noted Resolved    Rh negative status during pregnancy in third trimester 2020 by Tamiko Pulido MD No    Overview Signed 2/10/2020 11:03 AM by Kerline Caro MD     RhoGAM on 2/10         Encounter for supervision of normal first pregnancy in third trimester 10/22/2019 by Callie Lawson APRN No    Overview Addendum 2020 12:31 PM by Tamiko Pulido MD     A neg/ neg antibody/ Rubella Immune/GBS NEG  Dating: EVER 2020 by CRL (10/22/19)  Genetics: declined  Tdap: 2/10/20  Flu: Declines  Anatomy: rpt wnl; anterior placenta; male fetus  1h Glucola: 121  Lab Results   Component Value Date    HGB 13.5 10/22/2019    HCT 38.8 10/22/2019     10/22/2019   Breastfeed  BC plan: undecided         Asthma complicating pregnancy in third trimester 10/22/2019 by Callie Lawson APRN No    Overview Signed 10/25/2019  3:40 PM by Callie Lawson APRN     Albuterol inhaler PRN.                A/P: Alisa Coffey is a 24 y.o.  at 38w2d.  - RTC in 1 week  - Reviewed COVID-19 visitation policy  - Reviewed COVID-19 precautions     Diagnosis Plan   1. Rh negative status during pregnancy in third trimester     2. Asthma complicating pregnancy in third trimester     3. Encounter for supervision of normal first pregnancy in third trimester     4. Pain of round ligament during pregnancy  Comfort measures reviewed.    5. 38 weeks gestation of pregnancy       BENNY Tristan  2020  08:53

## 2020-05-04 ENCOUNTER — ROUTINE PRENATAL (OUTPATIENT)
Dept: OBSTETRICS AND GYNECOLOGY | Facility: CLINIC | Age: 25
End: 2020-05-04

## 2020-05-04 VITALS — DIASTOLIC BLOOD PRESSURE: 72 MMHG | BODY MASS INDEX: 27.76 KG/M2 | SYSTOLIC BLOOD PRESSURE: 118 MMHG | WEIGHT: 151.8 LBS

## 2020-05-04 DIAGNOSIS — O99.513 ASTHMA COMPLICATING PREGNANCY IN THIRD TRIMESTER: ICD-10-CM

## 2020-05-04 DIAGNOSIS — O26.893 RH NEGATIVE STATUS DURING PREGNANCY IN THIRD TRIMESTER: ICD-10-CM

## 2020-05-04 DIAGNOSIS — J45.909 ASTHMA COMPLICATING PREGNANCY IN THIRD TRIMESTER: ICD-10-CM

## 2020-05-04 DIAGNOSIS — Z3A.39 39 WEEKS GESTATION OF PREGNANCY: Primary | ICD-10-CM

## 2020-05-04 DIAGNOSIS — Z67.91 RH NEGATIVE STATUS DURING PREGNANCY IN THIRD TRIMESTER: ICD-10-CM

## 2020-05-04 DIAGNOSIS — Z34.03 ENCOUNTER FOR SUPERVISION OF NORMAL FIRST PREGNANCY IN THIRD TRIMESTER: ICD-10-CM

## 2020-05-04 PROCEDURE — 99213 OFFICE O/P EST LOW 20 MIN: CPT | Performed by: FAMILY MEDICINE

## 2020-05-04 RX ORDER — METHYLERGONOVINE MALEATE 0.2 MG/ML
200 INJECTION INTRAVENOUS ONCE AS NEEDED
Status: CANCELLED | OUTPATIENT
Start: 2020-05-04

## 2020-05-04 RX ORDER — LIDOCAINE HYDROCHLORIDE 10 MG/ML
5 INJECTION, SOLUTION EPIDURAL; INFILTRATION; INTRACAUDAL; PERINEURAL AS NEEDED
Status: CANCELLED | OUTPATIENT
Start: 2020-05-04

## 2020-05-04 RX ORDER — SODIUM CHLORIDE 0.9 % (FLUSH) 0.9 %
10 SYRINGE (ML) INJECTION AS NEEDED
Status: CANCELLED | OUTPATIENT
Start: 2020-05-04

## 2020-05-04 RX ORDER — MISOPROSTOL 100 UG/1
800 TABLET ORAL AS NEEDED
Status: CANCELLED | OUTPATIENT
Start: 2020-05-04

## 2020-05-04 RX ORDER — SODIUM CHLORIDE 0.9 % (FLUSH) 0.9 %
3 SYRINGE (ML) INJECTION EVERY 12 HOURS SCHEDULED
Status: CANCELLED | OUTPATIENT
Start: 2020-05-04

## 2020-05-04 RX ORDER — CARBOPROST TROMETHAMINE 250 UG/ML
250 INJECTION, SOLUTION INTRAMUSCULAR AS NEEDED
Status: CANCELLED | OUTPATIENT
Start: 2020-05-04

## 2020-05-04 NOTE — PROGRESS NOTES
CC: Prenatal visit    Alisa Coffey is a 24 y.o.  at 39w2d.  Doing well.  Denies contractions, LOF, or VB.  Reports good FM.  Reports increased pelvic pressure.    /72   Wt 68.9 kg (151 lb 12.8 oz)   LMP 2019   BMI 27.76 kg/m²   SVE: 2/60/-1 post/soft/vtx  Fundal Height (cm): 38 cm  Fetal Heart Rate: 135    10/19 Problems (from 10/22/19 to present)     Problem Noted Resolved    Rh negative status during pregnancy in third trimester 2020 by Tamiko Pulido MD No    Overview Signed 2/10/2020 11:03 AM by Kerline Caro MD     RhoGAM on 2/10         Encounter for supervision of normal first pregnancy in third trimester 10/22/2019 by Callie Lawson APRN No    Overview Addendum 2020 12:31 PM by Tamiko Pulido MD     A neg/ neg antibody/ Rubella Immune/GBS NEG  Dating: EVER 2020 by CRL (10/22/19)  Genetics: declined  Tdap: 2/10/20  Flu: Declines  Anatomy: rpt wnl; anterior placenta; male fetus  1h Glucola: 121  Lab Results   Component Value Date    HGB 13.5 10/22/2019    HCT 38.8 10/22/2019     10/22/2019   Breastfeed  BC plan: undecided         Asthma complicating pregnancy in third trimester 10/22/2019 by Callie Lawson APRN No    Overview Signed 10/25/2019  3:40 PM by Callie Lawson APRN     Albuterol inhaler PRN.                A/P: Alisa Coffey is a 24 y.o.  at 39w2d.  - Scheduled for IOL 2020 @ 6AM.  Murillo score: 7.  Plan for keane bulb + pitocin.     Diagnosis Plan   1. 39 weeks gestation of pregnancy     2. Rh negative status during pregnancy in third trimester     3. Asthma complicating pregnancy in third trimester     4. Encounter for supervision of normal first pregnancy in third trimester       Delivery Counseling:  I discussed with the patient the risks, benefits, and side effects of vaginal delivery as well as the risks, benefits, and side effects of alternative procedures/treatments.  Reviewed risks  for delivery including infection, bleeding (possibly requiring blood transfusion and/or additional surgery but not limited to hysterectomy), shoulder dystocia (which could cause temporary or permanent nerve injury to the baby), injury to surrounding organs (bowel, bladder, blood vessels, nerves, vagina possibly involving the rectum), future pelvic floor dysfunction, pain/discomfort, injury to the baby (laceration, hematoma, skull fracture, nerve injury and brain injury), and maternal/fetal death.  Reviewed possible operative delivery; reviewed fetal risks including cephalohematoma, intracranial hemorrhage, and skull fracture; reviewed maternal risks including the above.  Reviewed possible  section; reviewed risks including injury to surrounding organs (bowel, bladder, blood vessels, nerves, ureters, baby), bleeding (possibly requiring blood transfusion and/or hysterectomy), infection, maternal/fetal death.  The patient and family have been given opportunity to ask questions and these questions have been answered to their satisfaction.  Patient expresses understanding and desires to proceed with the procedure/treatment.       Signature  Tamiko Pulido MD  [facility] [place] [address]  Office: [phone]      This document has been electronically signed by Tamiko Pulido MD on May 4, 2020 08:52

## 2020-05-04 NOTE — H&P
[facility] - OB History and Physical  ?  ?  Name: Alisa Coffey   Age: 24 y.o.       Name of Clinic: [facility]   OB Provider: Tamiko Pulido MD    CC: Induction of labor    HPI:   24 y.o.  39w2d presents for routine OB visit.  She reports good FM, no LOF, no VB, no contractions.  Does report some pelvic pressure.  She wants to be set up for induction on her due date.  Plans to breastfeed, is having a boy and desires an epidural  ??  LMP: Patient's last menstrual period was 2019. EDC: Estimated Date of Delivery: 20 based on 1 TUS (10/22/19 at 11w3d)     Placental Location: anterior    Prenatal Course:   10/19 Problems (from 10/22/19 to present)     Problem Noted Resolved    Rh negative status during pregnancy in third trimester 2020 by Tamiko Pulido MD No    Overview Signed 2/10/2020 11:03 AM by Kerline Caro MD     RhoGAM on 2/10         Encounter for supervision of normal first pregnancy in third trimester 10/22/2019 by Callie Lawson APRN No    Overview Addendum 2020 12:31 PM by Tamiko Pulido MD     A neg/ neg antibody/ Rubella Immune/GBS NEG  Dating: EVER 2020 by CRL (10/22/19)  Genetics: declined  Tdap: 2/10/20  Flu: Declines  Anatomy: rpt wnl; anterior placenta; male fetus  1h Glucola: 121  Lab Results   Component Value Date    HGB 13.5 10/22/2019    HCT 38.8 10/22/2019     10/22/2019   Breastfeed  BC plan: undecided         Asthma complicating pregnancy in third trimester 10/22/2019 by Callie Lawson APRN No    Overview Signed 10/25/2019  3:40 PM by Callie Lawson APRN     Albuterol inhaler PRN.                Prior OB Hx:   OB History    Para Term  AB Living   1             SAB TAB Ectopic Molar Multiple Live Births                    # Outcome Date GA Lbr Moises/2nd Weight Sex Delivery Anes PTL Lv   1 Current              ?  Prenatal Labs:   External Prenatal Results      Pregnancy Outside Results - Transcribed From Office Records - See Scanned Records For Details     Test Value Date Time    Hgb 12.1 g/dL 02/10/20 1107      13.5 g/dL 10/22/19 1452    Hct 34.9 % 02/10/20 1107      38.8 % 10/22/19 1452    ABO A  10/22/19 1452    Rh Negative  10/22/19 1452    Antibody Screen Negative  02/10/20 1107      Negative  10/22/19 1452    Glucose Fasting GTT       Glucose Tolerance Test 1 hour       Glucose Tolerance Test 3 hour       Gonorrhea (discrete) Negative  10/22/19 1452    Chlamydia (discrete) Negative  10/22/19 1452    RPR Non-Reactive  10/22/19 1452    VDRL       Syphilis Antibody       Rubella Positive  10/22/19 1452    HBsAg Non-Reactive  10/22/19 1452    Herpes Simplex Virus PCR       Herpes Simplex VIrus Culture       HIV Non-Reactive  10/22/19 1452    Hep C RNA Quant PCR       Hep C Antibody Non-Reactive  10/22/19 1452    AFP       Group B Strep Negative  04/06/20 0815    GBS Susceptibility to Clindamycin       GBS Susceptibility to Erythromycin       Fetal Fibronectin       Genetic Testing, Maternal Blood             Drug Screening     Test Value Date Time    Urine Drug Screen       Amphetamine Screen Negative  10/22/19 1452    Barbiturate Screen Negative  10/22/19 1452    Benzodiazepine Screen Negative  10/22/19 1452    Methadone Screen Negative  10/22/19 1452    Phencyclidine Screen Negative  10/22/19 1452    Opiates Screen Negative  10/22/19 1452    THC Screen Negative  10/22/19 1452    Cocaine Screen       Propoxyphene Screen Negative  10/22/19 1452    Buprenorphine Screen Negative  10/22/19 1452    Methamphetamine Screen       Oxycodone Screen Negative  10/22/19 1452    Tricyclic Antidepressants Screen Negative  10/22/19 1452                Past Medical History:   Diagnosis Date   • Asthma    • Eczema    • GERD (gastroesophageal reflux disease)    • Supraventricular tachycardia (CMS/HCC) 4/13/2018     Past Surgical History:   Procedure Laterality Date   • CHOLECYSTECTOMY   10/01/2012    Cholecystectomy, laparoscopic (with intraoperative cholangiogram. Supervision & interpretation of intraoperative cholangiogram. Chronic cholecystitis. Biliary dyskinesia. Right upper quadrant pain)   • WISDOM TOOTH EXTRACTION       Family History   Problem Relation Age of Onset   • Rheum arthritis Mother    • Anxiety disorder Mother    • No Known Problems Father    • Anxiety disorder Maternal Grandmother    • Depression Maternal Grandmother    • COPD Maternal Grandfather    • No Known Problems Paternal Grandmother    • No Known Problems Paternal Grandfather    • Diabetes type II Other      Social History     Tobacco Use   • Smoking status: Passive Smoke Exposure - Never Smoker   • Smokeless tobacco: Never Used   Substance Use Topics   • Alcohol use: Yes     Comment: rare and small amount   • Drug use: No       Allergies   Allergen Reactions   • Hydrocodone-Acetaminophen Itching        Prior to Admission medications    Medication Sig Start Date End Date Taking? Authorizing Provider   albuterol sulfate HFA (VENTOLIN HFA) 108 (90 Base) MCG/ACT inhaler 2 puffs every 4 hours as needed for breathing 10/14/19   Campbell Moncada MD   BREO ELLIPTA 200-25 MCG/INH inhaler Inhale 1 puff Daily. 4/12/19   Campbell Moncada MD   cetirizine (zyrTEC) 10 MG tablet Take 1 tablet by mouth Every Night. 3/18/19   Golden Anthony MD   omeprazole (PRILOSEC) 20 MG capsule Take 1 capsule by mouth Daily. 3/18/19   Golden Anthony MD   Prenatal Vit-Fe Fumarate-FA (PRENATAL, CLASSIC, VITAMIN) 28-0.8 MG tablet tablet Take  by mouth Daily.    Provider, MD Rehan   triamcinolone (KENALOG) 0.1 % ointment Apply  topically to the appropriate area as directed 2 (Two) Times a Day. 1/29/20   Golden Anthony MD       Review of Systems   Constitutional: Negative for chills and fever.   Eyes: Negative for photophobia and visual disturbance.   Respiratory: Negative for cough and shortness of breath.    Cardiovascular: Negative for chest  pain, palpitations and leg swelling.   Gastrointestinal: Negative for abdominal pain, diarrhea, nausea and vomiting.   Genitourinary: Negative for dysuria, vaginal bleeding and vaginal discharge.   Skin: Negative for color change and rash.   Neurological: Negative for dizziness, light-headedness and headache.   Hematological: Negative for adenopathy. Does not bruise/bleed easily.   Psychiatric/Behavioral: Negative for depressed mood. The patient is not nervous/anxious.    All other systems reviewed and are negative.      Physical Exam:  Vitals:/72   Wt 68.9 kg (151 lb 12.8 oz)   LMP 2019   BMI 27.76 kg/m²     General:  Alert, cooperative, appears stated age, no distress    Skin:  Warm, well perfused no rashes   Lungs:  clear to auscultation bilaterally   Heart:  Regular rate and rhythm, S1S2 present or without murmur or extra heart sounds   GI: Soft; positive bowel sounds; gravid uterus; FHT present   Extremities: no edema    Reflexes: 2+, symmetrical   Fundal Height: size equals dates   Presentation: cephalic   FHT:  bpm   Contractions: Frequency:  none    Cervix: ?2/60/-1 post/soft/vtx    ?  Current Labs:  Lab Results   Component Value Date    WBC 11.26 (H) 02/10/2020    HGB 12.1 02/10/2020    HCT 34.9 02/10/2020    MCV 96.1 02/10/2020     02/10/2020       A/P: 24 y.o.  admit for IOL at 40w0d GA  Murillo Score: 7  Plan for keane bulb + pitocin  T&S, CBC, UDS  cEFM/TOCO  CLD  GBS negative  Anticipate   ?  Alisa and I have discussed pain goals for this hospitalization after reviewing her current clinical condition, medical history and prior pain experiences.  The goal is to keep her pain controlled.  She may have an epidural for labor analgesia; postpartum pain control with scheduled Tylenol/Motrin  ?  Ling Pulido MD  [facility]  [address] [place] [address], [place] [address]  PH: [phone]      This document has been  electronically signed by Tamiko Pulido MD on May 4, 2020 08:56

## 2020-05-05 ENCOUNTER — ANESTHESIA (OUTPATIENT)
Dept: LABOR AND DELIVERY | Facility: HOSPITAL | Age: 25
End: 2020-05-05

## 2020-05-05 ENCOUNTER — ANESTHESIA EVENT (OUTPATIENT)
Dept: LABOR AND DELIVERY | Facility: HOSPITAL | Age: 25
End: 2020-05-05

## 2020-05-05 ENCOUNTER — TELEPHONE (OUTPATIENT)
Dept: OBSTETRICS AND GYNECOLOGY | Facility: CLINIC | Age: 25
End: 2020-05-05

## 2020-05-05 ENCOUNTER — HOSPITAL ENCOUNTER (INPATIENT)
Facility: HOSPITAL | Age: 25
LOS: 2 days | Discharge: HOME | End: 2020-05-07
Attending: OBSTETRICS & GYNECOLOGY | Admitting: FAMILY MEDICINE

## 2020-05-05 DIAGNOSIS — Z34.03 ENCOUNTER FOR SUPERVISION OF NORMAL FIRST PREGNANCY IN THIRD TRIMESTER: ICD-10-CM

## 2020-05-05 PROBLEM — Z3A.39 39 WEEKS GESTATION OF PREGNANCY: Status: ACTIVE | Noted: 2020-05-05

## 2020-05-05 PROBLEM — O26.899 PAIN OF ROUND LIGAMENT DURING PREGNANCY: Status: RESOLVED | Noted: 2020-04-14 | Resolved: 2020-05-05

## 2020-05-05 PROBLEM — O26.893 PELVIC PRESSURE IN PREGNANCY, THIRD TRIMESTER: Status: RESOLVED | Noted: 2020-04-14 | Resolved: 2020-05-05

## 2020-05-05 PROBLEM — Z37.9 NORMAL LABOR: Status: RESOLVED | Noted: 2020-05-05 | Resolved: 2020-05-05

## 2020-05-05 PROBLEM — Z34.90 ENCOUNTER FOR ELECTIVE INDUCTION OF LABOR: Status: ACTIVE | Noted: 2020-05-05

## 2020-05-05 PROBLEM — Z3A.39 39 WEEKS GESTATION OF PREGNANCY: Status: RESOLVED | Noted: 2020-05-05 | Resolved: 2020-05-05

## 2020-05-05 PROBLEM — R10.2 PELVIC PRESSURE IN PREGNANCY, THIRD TRIMESTER: Status: RESOLVED | Noted: 2020-04-14 | Resolved: 2020-05-05

## 2020-05-05 PROBLEM — R10.2 PAIN OF ROUND LIGAMENT DURING PREGNANCY: Status: RESOLVED | Noted: 2020-04-14 | Resolved: 2020-05-05

## 2020-05-05 PROBLEM — Z37.9 NORMAL LABOR: Status: ACTIVE | Noted: 2020-05-05

## 2020-05-05 LAB
ABO GROUP BLD: NORMAL
AMPHET+METHAMPHET UR QL: NEGATIVE
AMPHETAMINES UR QL: NEGATIVE
BARBITURATES UR QL SCN: NEGATIVE
BENZODIAZ UR QL SCN: NEGATIVE
BLD GP AB SCN SERPL QL: NEGATIVE
BUPRENORPHINE SERPL-MCNC: NEGATIVE NG/ML
CANNABINOIDS SERPL QL: NEGATIVE
COCAINE UR QL: NEGATIVE
DEPRECATED RDW RBC AUTO: 42.3 FL (ref 37–54)
ERYTHROCYTE [DISTWIDTH] IN BLOOD BY AUTOMATED COUNT: 12.2 % (ref 12.3–15.4)
HCT VFR BLD AUTO: 38.5 % (ref 34–46.6)
HGB BLD-MCNC: 13.2 G/DL (ref 12–15.9)
Lab: NORMAL
MCH RBC QN AUTO: 32.8 PG (ref 26.6–33)
MCHC RBC AUTO-ENTMCNC: 34.3 G/DL (ref 31.5–35.7)
MCV RBC AUTO: 95.8 FL (ref 79–97)
METHADONE UR QL SCN: NEGATIVE
OPIATES UR QL: NEGATIVE
OXYCODONE UR QL SCN: NEGATIVE
PCP UR QL SCN: NEGATIVE
PLATELET # BLD AUTO: 209 10*3/MM3 (ref 140–450)
PMV BLD AUTO: 11.1 FL (ref 6–12)
PROPOXYPH UR QL: NEGATIVE
RBC # BLD AUTO: 4.02 10*6/MM3 (ref 3.77–5.28)
RH BLD: NEGATIVE
T&S EXPIRATION DATE: NORMAL
TRICYCLICS UR QL SCN: NEGATIVE
WBC NRBC COR # BLD: 13.94 10*3/MM3 (ref 3.4–10.8)

## 2020-05-05 PROCEDURE — 25010000002 FENTANYL CITRATE (PF) 250 MCG/5ML SOLUTION: Performed by: NURSE ANESTHETIST, CERTIFIED REGISTERED

## 2020-05-05 PROCEDURE — 86901 BLOOD TYPING SEROLOGIC RH(D): CPT | Performed by: FAMILY MEDICINE

## 2020-05-05 PROCEDURE — 85027 COMPLETE CBC AUTOMATED: CPT | Performed by: FAMILY MEDICINE

## 2020-05-05 PROCEDURE — 51702 INSERT TEMP BLADDER CATH: CPT

## 2020-05-05 PROCEDURE — C1755 CATHETER, INTRASPINAL: HCPCS | Performed by: NURSE ANESTHETIST, CERTIFIED REGISTERED

## 2020-05-05 PROCEDURE — 10907ZC DRAINAGE OF AMNIOTIC FLUID, THERAPEUTIC FROM PRODUCTS OF CONCEPTION, VIA NATURAL OR ARTIFICIAL OPENING: ICD-10-PCS | Performed by: FAMILY MEDICINE

## 2020-05-05 PROCEDURE — 25010000002 BUTORPHANOL PER 1 MG

## 2020-05-05 PROCEDURE — 25010000002 PROMETHAZINE PER 50 MG

## 2020-05-05 PROCEDURE — 80306 DRUG TEST PRSMV INSTRMNT: CPT | Performed by: FAMILY MEDICINE

## 2020-05-05 PROCEDURE — 86850 RBC ANTIBODY SCREEN: CPT | Performed by: FAMILY MEDICINE

## 2020-05-05 PROCEDURE — 59409 OBSTETRICAL CARE: CPT | Performed by: STUDENT IN AN ORGANIZED HEALTH CARE EDUCATION/TRAINING PROGRAM

## 2020-05-05 PROCEDURE — C1755 CATHETER, INTRASPINAL: HCPCS

## 2020-05-05 PROCEDURE — 86900 BLOOD TYPING SEROLOGIC ABO: CPT | Performed by: FAMILY MEDICINE

## 2020-05-05 RX ORDER — FENTANYL CITRATE 50 UG/ML
INJECTION, SOLUTION INTRAMUSCULAR; INTRAVENOUS AS NEEDED
Status: DISCONTINUED | OUTPATIENT
Start: 2020-05-05 | End: 2020-05-05 | Stop reason: SURG

## 2020-05-05 RX ORDER — LANOLIN 100 %
OINTMENT (GRAM) TOPICAL
Status: DISCONTINUED | OUTPATIENT
Start: 2020-05-05 | End: 2020-05-07 | Stop reason: HOSPADM

## 2020-05-05 RX ORDER — LIDOCAINE HYDROCHLORIDE AND EPINEPHRINE 15; 5 MG/ML; UG/ML
INJECTION, SOLUTION EPIDURAL AS NEEDED
Status: DISCONTINUED | OUTPATIENT
Start: 2020-05-05 | End: 2020-05-05 | Stop reason: SURG

## 2020-05-05 RX ORDER — PROMETHAZINE HYDROCHLORIDE 25 MG/ML
12.5 INJECTION, SOLUTION INTRAMUSCULAR; INTRAVENOUS ONCE
Status: COMPLETED | OUTPATIENT
Start: 2020-05-05 | End: 2020-05-05

## 2020-05-05 RX ORDER — LIDOCAINE HYDROCHLORIDE 10 MG/ML
5 INJECTION, SOLUTION EPIDURAL; INFILTRATION; INTRACAUDAL; PERINEURAL AS NEEDED
Status: DISCONTINUED | OUTPATIENT
Start: 2020-05-05 | End: 2020-05-05

## 2020-05-05 RX ORDER — CARBOPROST TROMETHAMINE 250 UG/ML
250 INJECTION, SOLUTION INTRAMUSCULAR AS NEEDED
Status: DISCONTINUED | OUTPATIENT
Start: 2020-05-05 | End: 2020-05-05 | Stop reason: HOSPADM

## 2020-05-05 RX ORDER — SODIUM CHLORIDE, SODIUM LACTATE, POTASSIUM CHLORIDE, CALCIUM CHLORIDE 600; 310; 30; 20 MG/100ML; MG/100ML; MG/100ML; MG/100ML
INJECTION, SOLUTION INTRAVENOUS
Status: COMPLETED
Start: 2020-05-05 | End: 2020-05-05

## 2020-05-05 RX ORDER — SODIUM CHLORIDE, SODIUM LACTATE, POTASSIUM CHLORIDE, CALCIUM CHLORIDE 600; 310; 30; 20 MG/100ML; MG/100ML; MG/100ML; MG/100ML
125 INJECTION, SOLUTION INTRAVENOUS CONTINUOUS
Status: DISCONTINUED | OUTPATIENT
Start: 2020-05-05 | End: 2020-05-07

## 2020-05-05 RX ORDER — PANTOPRAZOLE SODIUM 40 MG/1
40 TABLET, DELAYED RELEASE ORAL EVERY MORNING
Status: DISCONTINUED | OUTPATIENT
Start: 2020-05-06 | End: 2020-05-07 | Stop reason: HOSPADM

## 2020-05-05 RX ORDER — BISACODYL 10 MG
10 SUPPOSITORY, RECTAL RECTAL DAILY PRN
Status: DISCONTINUED | OUTPATIENT
Start: 2020-05-06 | End: 2020-05-07 | Stop reason: HOSPADM

## 2020-05-05 RX ORDER — HYDROCORTISONE 25 MG/G
1 CREAM TOPICAL AS NEEDED
Status: DISCONTINUED | OUTPATIENT
Start: 2020-05-05 | End: 2020-05-07 | Stop reason: HOSPADM

## 2020-05-05 RX ORDER — SODIUM CHLORIDE 0.9 % (FLUSH) 0.9 %
3 SYRINGE (ML) INJECTION EVERY 12 HOURS SCHEDULED
Status: DISCONTINUED | OUTPATIENT
Start: 2020-05-05 | End: 2020-05-05

## 2020-05-05 RX ORDER — MISOPROSTOL 200 UG/1
800 TABLET ORAL AS NEEDED
Status: DISCONTINUED | OUTPATIENT
Start: 2020-05-05 | End: 2020-05-05 | Stop reason: HOSPADM

## 2020-05-05 RX ORDER — METHYLERGONOVINE MALEATE 0.2 MG/ML
200 INJECTION INTRAVENOUS ONCE AS NEEDED
Status: DISCONTINUED | OUTPATIENT
Start: 2020-05-05 | End: 2020-05-05 | Stop reason: HOSPADM

## 2020-05-05 RX ORDER — SODIUM CHLORIDE 0.9 % (FLUSH) 0.9 %
10 SYRINGE (ML) INJECTION AS NEEDED
Status: DISCONTINUED | OUTPATIENT
Start: 2020-05-05 | End: 2020-05-05 | Stop reason: HOSPADM

## 2020-05-05 RX ORDER — SODIUM CHLORIDE 0.9 % (FLUSH) 0.9 %
1-10 SYRINGE (ML) INJECTION AS NEEDED
Status: DISCONTINUED | OUTPATIENT
Start: 2020-05-05 | End: 2020-05-07 | Stop reason: HOSPADM

## 2020-05-05 RX ORDER — ACETAMINOPHEN 500 MG
1000 TABLET ORAL EVERY 6 HOURS
Status: DISCONTINUED | OUTPATIENT
Start: 2020-05-05 | End: 2020-05-07 | Stop reason: HOSPADM

## 2020-05-05 RX ORDER — PROMETHAZINE HYDROCHLORIDE 25 MG/ML
INJECTION, SOLUTION INTRAMUSCULAR; INTRAVENOUS
Status: COMPLETED
Start: 2020-05-05 | End: 2020-05-05

## 2020-05-05 RX ORDER — SODIUM CHLORIDE, SODIUM LACTATE, POTASSIUM CHLORIDE, CALCIUM CHLORIDE 600; 310; 30; 20 MG/100ML; MG/100ML; MG/100ML; MG/100ML
INJECTION, SOLUTION INTRAVENOUS
Status: DISCONTINUED
Start: 2020-05-05 | End: 2020-05-05 | Stop reason: WASHOUT

## 2020-05-05 RX ORDER — OXYTOCIN/0.9 % SODIUM CHLORIDE 30/500 ML
2 PLASTIC BAG, INJECTION (ML) INTRAVENOUS
Status: DISCONTINUED | OUTPATIENT
Start: 2020-05-05 | End: 2020-05-06

## 2020-05-05 RX ORDER — ACETAMINOPHEN 325 MG/1
650 TABLET ORAL EVERY 6 HOURS PRN
COMMUNITY
End: 2020-05-07 | Stop reason: HOSPADM

## 2020-05-05 RX ORDER — PRENATAL VIT/IRON FUM/FOLIC AC 27MG-0.8MG
1 TABLET ORAL DAILY
Status: DISCONTINUED | OUTPATIENT
Start: 2020-05-06 | End: 2020-05-07 | Stop reason: HOSPADM

## 2020-05-05 RX ORDER — IBUPROFEN 800 MG/1
800 TABLET ORAL EVERY 8 HOURS SCHEDULED
Status: DISCONTINUED | OUTPATIENT
Start: 2020-05-05 | End: 2020-05-07 | Stop reason: HOSPADM

## 2020-05-05 RX ADMIN — PROMETHAZINE HYDROCHLORIDE 12.5 MG: 25 INJECTION, SOLUTION INTRAMUSCULAR; INTRAVENOUS at 10:59

## 2020-05-05 RX ADMIN — Medication 2 MG: at 10:59

## 2020-05-05 RX ADMIN — PROMETHAZINE HYDROCHLORIDE 12.5 MG: 25 INJECTION INTRAMUSCULAR; INTRAVENOUS at 10:59

## 2020-05-05 RX ADMIN — OXYTOCIN-SODIUM CHLORIDE 0.9% IV SOLN 30 UNIT/500ML 2 MILLI-UNITS/MIN: 30-0.9/5 SOLUTION at 14:53

## 2020-05-05 RX ADMIN — ACETAMINOPHEN 1000 MG: 500 TABLET ORAL at 21:23

## 2020-05-05 RX ADMIN — SODIUM CHLORIDE, POTASSIUM CHLORIDE, SODIUM LACTATE AND CALCIUM CHLORIDE 1000 ML: 600; 310; 30; 20 INJECTION, SOLUTION INTRAVENOUS at 13:06

## 2020-05-05 RX ADMIN — FENTANYL CITRATE 250 MCG: 50 INJECTION, SOLUTION INTRAMUSCULAR; INTRAVENOUS at 13:23

## 2020-05-05 RX ADMIN — Medication 10 ML/HR: at 13:23

## 2020-05-05 RX ADMIN — LIDOCAINE HYDROCHLORIDE AND EPINEPHRINE 3 ML: 15; 5 INJECTION, SOLUTION EPIDURAL at 13:19

## 2020-05-05 RX ADMIN — IBUPROFEN 800 MG: 800 TABLET ORAL at 21:23

## 2020-05-05 RX ADMIN — SODIUM CHLORIDE, POTASSIUM CHLORIDE, SODIUM LACTATE AND CALCIUM CHLORIDE 1000 ML: 600; 310; 30; 20 INJECTION, SOLUTION INTRAVENOUS at 11:01

## 2020-05-05 RX ADMIN — BUTORPHANOL TARTRATE 2 MG: 2 INJECTION, SOLUTION INTRAMUSCULAR; INTRAVENOUS at 10:59

## 2020-05-05 NOTE — ANESTHESIA PREPROCEDURE EVALUATION
Anesthesia Evaluation     NPO Solid Status: > 8 hours  NPO Liquid Status: < 2 hours           Airway   Mallampati: II  TM distance: >3 FB  Neck ROM: full  no difficulty expected  Dental - normal exam     Pulmonary - normal exam   (+) asthma,  Cardiovascular - normal exam        Neuro/Psych  GI/Hepatic/Renal/Endo    (+)  GERD,      Musculoskeletal     Abdominal    Substance History      OB/GYN    (+) Pregnant,         Other                        Anesthesia Plan    ASA 2     epidural       Anesthetic plan, all risks, benefits, and alternatives have been provided, discussed and informed consent has been obtained with: patient.

## 2020-05-05 NOTE — ANESTHESIA PROCEDURE NOTES
Labor Epidural      Patient location during procedure: OB  Performed By  CRNA: Chava Chavez CRNA  Preanesthetic Checklist  Completed: patient identified, site marked, surgical consent, pre-op evaluation, timeout performed, IV checked, risks and benefits discussed and monitors and equipment checked  Prep:  Pt Position:sitting  Sterile Tech:gloves, cap, gown, mask and sterile barrier  Prep:DuraPrep  Monitoring:blood pressure monitoring and continuous pulse oximetry  Epidural Block Procedure:  Approach:midline  Guidance:landmark technique and palpation technique  Location:L3-L4  Needle Type:Tuohy  Needle Gauge:17 G  Loss of Resistance Medium: air  Loss of Resistance: 7cm  Cath Depth at skin:12 cm  Paresthesia: none  Aspiration:negative  Test Dose:negative  Number of Attempts: 1  Post Assessment:  Dressing:occlusive dressing applied and secured with tape  Pt Tolerance:patient tolerated the procedure well with no apparent complications  Complications:no

## 2020-05-05 NOTE — TELEPHONE ENCOUNTER
PATIENT SAYS SHE IS NOT SURE IF SHE IS HAVING CONTRACTIONS OR MAKENNA TRAVIS, SHE IS ALSO LEAKING FLUID. PT IS 39 WKS  SPOKE DR SIS MACE AND SAID TO SEND PT TO ER

## 2020-05-06 LAB
BASOPHILS # BLD AUTO: 0.03 10*3/MM3 (ref 0–0.2)
BASOPHILS NFR BLD AUTO: 0.2 % (ref 0–1.5)
DEPRECATED RDW RBC AUTO: 44.6 FL (ref 37–54)
EOSINOPHIL # BLD AUTO: 0.04 10*3/MM3 (ref 0–0.4)
EOSINOPHIL NFR BLD AUTO: 0.3 % (ref 0.3–6.2)
ERYTHROCYTE [DISTWIDTH] IN BLOOD BY AUTOMATED COUNT: 12.5 % (ref 12.3–15.4)
FETAL BLEED: NEGATIVE
HCT VFR BLD AUTO: 36.9 % (ref 34–46.6)
HGB BLD-MCNC: 12.1 G/DL (ref 12–15.9)
IMM GRANULOCYTES # BLD AUTO: 0.07 10*3/MM3 (ref 0–0.05)
IMM GRANULOCYTES NFR BLD AUTO: 0.4 % (ref 0–0.5)
LYMPHOCYTES # BLD AUTO: 2.15 10*3/MM3 (ref 0.7–3.1)
LYMPHOCYTES NFR BLD AUTO: 13.6 % (ref 19.6–45.3)
MCH RBC QN AUTO: 32.1 PG (ref 26.6–33)
MCHC RBC AUTO-ENTMCNC: 32.8 G/DL (ref 31.5–35.7)
MCV RBC AUTO: 97.9 FL (ref 79–97)
MONOCYTES # BLD AUTO: 0.94 10*3/MM3 (ref 0.1–0.9)
MONOCYTES NFR BLD AUTO: 6 % (ref 5–12)
NEUTROPHILS # BLD AUTO: 12.54 10*3/MM3 (ref 1.7–7)
NEUTROPHILS NFR BLD AUTO: 79.5 % (ref 42.7–76)
NRBC BLD AUTO-RTO: 0 /100 WBC (ref 0–0.2)
NUMBER OF DOSES: NORMAL
PLATELET # BLD AUTO: 172 10*3/MM3 (ref 140–450)
PMV BLD AUTO: 11.4 FL (ref 6–12)
RBC # BLD AUTO: 3.77 10*6/MM3 (ref 3.77–5.28)
WBC NRBC COR # BLD: 15.77 10*3/MM3 (ref 3.4–10.8)

## 2020-05-06 PROCEDURE — 85025 COMPLETE CBC W/AUTO DIFF WBC: CPT | Performed by: FAMILY MEDICINE

## 2020-05-06 PROCEDURE — 85461 HEMOGLOBIN FETAL: CPT | Performed by: FAMILY MEDICINE

## 2020-05-06 PROCEDURE — 99024 POSTOP FOLLOW-UP VISIT: CPT | Performed by: STUDENT IN AN ORGANIZED HEALTH CARE EDUCATION/TRAINING PROGRAM

## 2020-05-06 PROCEDURE — 25010000003 RHO D IMMUNE GLOBULIN 1500 UNITS SOLUTION PREFILLED SYRINGE: Performed by: FAMILY MEDICINE

## 2020-05-06 RX ADMIN — ACETAMINOPHEN 1000 MG: 500 TABLET ORAL at 13:29

## 2020-05-06 RX ADMIN — PANTOPRAZOLE SODIUM 40 MG: 40 TABLET, DELAYED RELEASE ORAL at 06:00

## 2020-05-06 RX ADMIN — ACETAMINOPHEN 1000 MG: 500 TABLET ORAL at 02:56

## 2020-05-06 RX ADMIN — BENZOCAINE AND LEVOMENTHOL: 200; 5 SPRAY TOPICAL at 02:56

## 2020-05-06 RX ADMIN — ACETAMINOPHEN 1000 MG: 500 TABLET ORAL at 08:22

## 2020-05-06 RX ADMIN — ACETAMINOPHEN 1000 MG: 500 TABLET ORAL at 20:32

## 2020-05-06 RX ADMIN — IBUPROFEN 800 MG: 800 TABLET ORAL at 13:30

## 2020-05-06 RX ADMIN — HUMAN RHO(D) IMMUNE GLOBULIN 1500 UNITS: 300 INJECTION, SOLUTION INTRAMUSCULAR at 20:33

## 2020-05-06 RX ADMIN — PRENATAL VIT W/ FE FUMARATE-FA TAB 27-0.8 MG 1 TABLET: 27-0.8 TAB at 08:22

## 2020-05-06 RX ADMIN — IBUPROFEN 800 MG: 800 TABLET ORAL at 22:33

## 2020-05-06 RX ADMIN — IBUPROFEN 800 MG: 800 TABLET ORAL at 05:59

## 2020-05-07 VITALS
DIASTOLIC BLOOD PRESSURE: 66 MMHG | HEART RATE: 66 BPM | TEMPERATURE: 97.9 F | HEIGHT: 62 IN | OXYGEN SATURATION: 99 % | SYSTOLIC BLOOD PRESSURE: 128 MMHG | WEIGHT: 151.8 LBS | BODY MASS INDEX: 27.94 KG/M2 | RESPIRATION RATE: 18 BRPM

## 2020-05-07 PROCEDURE — 99024 POSTOP FOLLOW-UP VISIT: CPT | Performed by: FAMILY MEDICINE

## 2020-05-07 RX ORDER — ACETAMINOPHEN AND CODEINE PHOSPHATE 120; 12 MG/5ML; MG/5ML
1 SOLUTION ORAL DAILY
Qty: 28 TABLET | Refills: 12 | Status: SHIPPED | OUTPATIENT
Start: 2020-05-20 | End: 2021-05-23 | Stop reason: SDUPTHER

## 2020-05-07 RX ORDER — ACETAMINOPHEN 500 MG
1000 TABLET ORAL EVERY 6 HOURS
Qty: 120 TABLET | Refills: 0 | Status: SHIPPED | OUTPATIENT
Start: 2020-05-07 | End: 2020-12-07

## 2020-05-07 RX ORDER — IBUPROFEN 800 MG/1
800 TABLET ORAL EVERY 8 HOURS SCHEDULED
Qty: 45 TABLET | Refills: 0 | Status: SHIPPED | OUTPATIENT
Start: 2020-05-07 | End: 2020-06-17

## 2020-05-07 RX ORDER — DOCUSATE SODIUM 100 MG/1
100 CAPSULE, LIQUID FILLED ORAL 2 TIMES DAILY
Status: DISCONTINUED | OUTPATIENT
Start: 2020-05-07 | End: 2020-05-07 | Stop reason: HOSPADM

## 2020-05-07 RX ORDER — LANOLIN 100 %
OINTMENT (GRAM) TOPICAL
Qty: 40 G | Refills: 1 | Status: SHIPPED | OUTPATIENT
Start: 2020-05-07 | End: 2020-12-07

## 2020-05-07 RX ADMIN — PANTOPRAZOLE SODIUM 40 MG: 40 TABLET, DELAYED RELEASE ORAL at 08:25

## 2020-05-07 RX ADMIN — DOCUSATE SODIUM 100 MG: 100 CAPSULE, LIQUID FILLED ORAL at 08:46

## 2020-05-07 RX ADMIN — ACETAMINOPHEN 1000 MG: 500 TABLET ORAL at 08:26

## 2020-05-07 RX ADMIN — IBUPROFEN 800 MG: 800 TABLET ORAL at 05:41

## 2020-05-07 RX ADMIN — PRENATAL VIT W/ FE FUMARATE-FA TAB 27-0.8 MG 1 TABLET: 27-0.8 TAB at 08:25

## 2020-05-19 ENCOUNTER — TELEPHONE (OUTPATIENT)
Dept: LABOR AND DELIVERY | Facility: HOSPITAL | Age: 25
End: 2020-05-19

## 2020-05-19 NOTE — TELEPHONE ENCOUNTER
Lactation follow up call made. Mother states breastfeeding is going well. No questions or concerns.

## 2020-05-20 ENCOUNTER — OFFICE VISIT (OUTPATIENT)
Dept: OBSTETRICS AND GYNECOLOGY | Facility: CLINIC | Age: 25
End: 2020-05-20

## 2020-05-20 VITALS
WEIGHT: 130.4 LBS | SYSTOLIC BLOOD PRESSURE: 98 MMHG | BODY MASS INDEX: 24 KG/M2 | HEIGHT: 62 IN | DIASTOLIC BLOOD PRESSURE: 62 MMHG

## 2020-05-20 PROCEDURE — 0503F POSTPARTUM CARE VISIT: CPT | Performed by: STUDENT IN AN ORGANIZED HEALTH CARE EDUCATION/TRAINING PROGRAM

## 2020-05-20 NOTE — PROGRESS NOTES
"2 week postpartum visit      Alisa Coffey is a 24 y.o.  s/p  on 2020, who presents today for a 2 week postpartum check.  The patient states no active issues. Pt with breast fullness.  Patient denies/worsening bleeding, dysuria, issues with defecation or flatulence. Pt with no signs or symptoms of postpartum depression.  Menstrual cycles have not resumed.  Breastfeeding.  Awaiting start micronor for contraception.  She has not resumed sexual intercourse.  Denies bowel or bladder issues.    PHYSICAL EXAM:    BP 98/62   Ht 157.5 cm (62\")   Wt 59.1 kg (130 lb 6.4 oz)   LMP 2019   BMI 23.85 kg/m²   Abdomen: +BS, benign, no masses, soft, non-tender.  Extremities: No deep calf tenderness.  Postpartum Depression Screening Questionnaire: 8, no treatment indicated.    IMPRESSION/PLAN:  24 y.o.  s/p , 2 weeks postpartum.  Doing well.    - Recovering nicely from her delivery  - Continue pelvic rest  - Contraception: micronor to start today or when patient is comfortable  - Return in 4wks for 6wk postpartum visit          This document has been electronically signed by Jesus Vazquez III, MD on May 20, 2020 12:59        "

## 2020-05-21 NOTE — PROGRESS NOTES
I have seen & examined the patient. I have reviewed the notes, assessments, and/or procedures performed by Jesus Vazquez MD, I concur with her/his documentation of Alisa Coffey.     2 weeks postpartum from uncomplicated .  Baby Zafar is doing well. Breast feeding without issues.  EPDS: 3, no treatment needed.  Can start Micronor for contraception.  Continue pelvic rest until 6 week postpartum visit.    Signature  Tamiko Pulido MD  [facility]  [address] [place] [address]  Office: [phone]      This document has been electronically signed by Tamiko Pulido MD on May 21, 2020 13:58

## 2020-06-17 ENCOUNTER — OFFICE VISIT (OUTPATIENT)
Dept: OBSTETRICS AND GYNECOLOGY | Facility: CLINIC | Age: 25
End: 2020-06-17

## 2020-06-17 VITALS
DIASTOLIC BLOOD PRESSURE: 62 MMHG | BODY MASS INDEX: 23 KG/M2 | HEIGHT: 62 IN | WEIGHT: 125 LBS | SYSTOLIC BLOOD PRESSURE: 106 MMHG

## 2020-06-17 PROCEDURE — 99213 OFFICE O/P EST LOW 20 MIN: CPT | Performed by: STUDENT IN AN ORGANIZED HEALTH CARE EDUCATION/TRAINING PROGRAM

## 2020-06-17 NOTE — PROGRESS NOTES
"6 week postpartum visit      Alisa Coffey is a 24 y.o.  s/p  on 20, who presents today for a 6 week postpartum check.  The patient states she is currently doing well. She states she was having vaginal bleeding for 1 week after delivery but it has stopped since then.  She is still complaining of some lower back pain that bothers her when she initially sits down but is otherwise doing well. She has started her Micronor birth control as of this previous . Patient denies postpartum depression.  Pt states she did notice some vaginal bleeding 2 weeks ago that lasted 2-3 days but then stopped. She is not aware if this was a period.  She is breastfeeding and also pumping so another family member can feed baby when she returns to work.  Desires Micronor for contraception, has started this as of 20.  She has not resumed sexual intercourse.  Denies bowel or bladder issues.    PHYSICAL EXAM:    /62   Ht 157.5 cm (62\")   Wt 56.7 kg (125 lb)   LMP 2019   BMI 22.86 kg/m²   Abdomen: +BS, benign, no masses, soft, non-tender.    Bimanual exam: External genitalia appear normal.  Vagina pink, moist, rugated.  Uterus  involuted to normal size and non-tender.  No palpable masses in adnexa.   Extremities: No deep calf tenderness.  Postpartum Depression Screening Questionnaire: [place] score of 3 at previous vist, No treatment indicated.    IMPRESSION/PLAN:  24 y.o.  s/p term , 6 weeks postpartum.  Doing well.  - Recovered nicely from her delivery  - Return to normal physical activity  - Contraception: Micronor birth control pill. Will change BC method when Pt no longer breastfeeding.  - Resume annual gynecological examinations        This document has been electronically signed by Ebony Cuellar MD on 2020 11:22            "

## 2020-06-17 NOTE — PROGRESS NOTES
I have seen & examined the patient. I have reviewed the notes, assessments, and/or procedures performed by Ebony Cuellar MD, I concur with her/his documentation of Alisa Coffey.      6wks postpartum from . Doing well. Breastfeeding without issues. Baby Zafar is doing well. Menses have not resumed. No depression symptoms.  Using Micronor for contraception without issues.    - return to normal activity  - resume annual well woman exams    Signature  Tamiko Pulido MD  [facility]  [address] [place] [address]  Office: [phone]      This document has been electronically signed by Tamiko Pulido MD on 2020 11:33

## 2020-12-07 ENCOUNTER — OFFICE VISIT (OUTPATIENT)
Dept: FAMILY MEDICINE CLINIC | Facility: CLINIC | Age: 25
End: 2020-12-07

## 2020-12-07 VITALS
TEMPERATURE: 97.7 F | OXYGEN SATURATION: 98 % | SYSTOLIC BLOOD PRESSURE: 100 MMHG | HEIGHT: 62 IN | WEIGHT: 113.4 LBS | HEART RATE: 88 BPM | BODY MASS INDEX: 20.87 KG/M2 | DIASTOLIC BLOOD PRESSURE: 60 MMHG

## 2020-12-07 DIAGNOSIS — F41.9 ANXIETY: Primary | ICD-10-CM

## 2020-12-07 PROCEDURE — 99213 OFFICE O/P EST LOW 20 MIN: CPT | Performed by: FAMILY MEDICINE

## 2020-12-07 NOTE — PROGRESS NOTES
" Subjective   Alisa Coffey is a 25 y.o. female.     Chief Complaint   Patient presents with   • Depression   • Irritable             History of Present Illness     About 7 months post partum.  Some blues, easily irritated.  Wants medicine.  Breast feeding. Working 2 jobs. Not suicidal.  Not sleeping well.  [place].  Reports history of ocd.     Review of Systems   Constitutional: Negative for chills, fatigue and fever.   HENT: Negative for congestion, ear discharge, ear pain, facial swelling, hearing loss, postnasal drip, rhinorrhea, sinus pressure, sore throat, trouble swallowing and voice change.    Eyes: Negative for discharge, redness and visual disturbance.   Respiratory: Negative for cough, chest tightness, shortness of breath and wheezing.    Cardiovascular: Negative for chest pain and palpitations.   Gastrointestinal: Negative for abdominal pain, blood in stool, constipation, diarrhea, nausea and vomiting.   Endocrine: Negative for polydipsia and polyuria.   Genitourinary: Negative for dysuria, flank pain, hematuria and urgency.   Musculoskeletal: Negative for arthralgias, back pain, joint swelling and myalgias.   Skin: Negative for rash.   Neurological: Negative for dizziness, weakness, numbness and headaches.   Hematological: Negative for adenopathy.   Psychiatric/Behavioral: Positive for dysphoric mood. Negative for confusion and sleep disturbance. The patient is not nervous/anxious.            /60 (BP Location: Left arm, Patient Position: Sitting, Cuff Size: Adult)   Pulse 88   Temp 97.7 °F (36.5 °C) (Temporal)   Ht 157.5 cm (62.01\")   Wt 51.4 kg (113 lb 6.4 oz)   SpO2 98%   BMI 20.74 kg/m²       Objective     Physical Exam  Vitals signs and nursing note reviewed.   Constitutional:       Appearance: Normal appearance. She is well-developed.   HENT:      Head: Normocephalic and atraumatic.      Right Ear: External ear normal.      Left Ear: External ear normal.      Nose: Nose normal. " No rhinorrhea.   Eyes:      General: No scleral icterus.     Extraocular Movements: Extraocular movements intact.      Conjunctiva/sclera: Conjunctivae normal.      Pupils: Pupils are equal, round, and reactive to light.   Neck:      Musculoskeletal: Normal range of motion and neck supple.   Cardiovascular:      Rate and Rhythm: Normal rate and regular rhythm.      Heart sounds: Normal heart sounds. No friction rub. No gallop.    Pulmonary:      Effort: Pulmonary effort is normal.      Breath sounds: Normal breath sounds.   Abdominal:      General: Bowel sounds are normal. There is no distension.      Palpations: Abdomen is soft.      Tenderness: There is no abdominal tenderness.   Musculoskeletal: Normal range of motion.         General: No deformity.   Skin:     General: Skin is warm and dry.      Findings: No erythema or rash.   Neurological:      General: No focal deficit present.      Mental Status: She is alert and oriented to person, place, and time.      Cranial Nerves: No cranial nerve deficit.   Psychiatric:         Behavior: Behavior normal.         Thought Content: Thought content normal.         Judgment: Judgment normal.             PAST MEDICAL HISTORY     Past Medical History:   Diagnosis Date   • Asthma    • Eczema    • GERD (gastroesophageal reflux disease)    • Supraventricular tachycardia (CMS/HCC) 4/13/2018      PAST SURGICAL HISTORY     Past Surgical History:   Procedure Laterality Date   • CHOLECYSTECTOMY  10/01/2012    Cholecystectomy, laparoscopic (with intraoperative cholangiogram. Supervision & interpretation of intraoperative cholangiogram. Chronic cholecystitis. Biliary dyskinesia. Right upper quadrant pain)   • WISDOM TOOTH EXTRACTION        SOCIAL HISTORY     Social History     Socioeconomic History   • Marital status: Single     Spouse name: Not on file   • Number of children: Not on file   • Years of education: Not on file   • Highest education level: Not on file   Tobacco Use   •  Smoking status: Passive Smoke Exposure - Never Smoker   • Smokeless tobacco: Never Used   Substance and Sexual Activity   • Alcohol use: Yes     Comment: rare and small amount   • Drug use: No   • Sexual activity: Not Currently     Partners: Male     Birth control/protection: None      ALLERGIES   Hydrocodone-acetaminophen   MEDICATIONS     Current Outpatient Medications   Medication Sig Dispense Refill   • norethindrone (MICRONOR) 0.35 MG tablet Take 1 tablet by mouth Daily. 28 tablet 12   • albuterol sulfate HFA (VENTOLIN HFA) 108 (90 Base) MCG/ACT inhaler 2 puffs every 4 hours as needed for breathing 1 inhaler 5   • BREO ELLIPTA 200-25 MCG/INH inhaler Inhale 1 puff Daily. 1 each 5   • omeprazole (PRILOSEC) 20 MG capsule Take 1 capsule by mouth Daily. 90 capsule 3   • Prenatal Vit-Fe Fumarate-FA (PRENATAL, CLASSIC, VITAMIN) 28-0.8 MG tablet tablet Take  by mouth Daily.     • sertraline (Zoloft) 50 MG tablet Take 0.5-1 tablets by mouth Every Night. 90 tablet 3   • triamcinolone (KENALOG) 0.1 % ointment Apply  topically to the appropriate area as directed 2 (Two) Times a Day. 80 g 0     No current facility-administered medications for this visit.         The following portions of the patient's history were reviewed and updated as appropriate: allergies, current medications, past family history, past medical history, past social history, past surgical history and problem list.        Assessment/Plan   Diagnoses and all orders for this visit:    1. Anxiety (Primary)    Other orders  -     sertraline (Zoloft) 50 MG tablet; Take 0.5-1 tablets by mouth Every Night.  Dispense: 90 tablet; Refill: 3      Discussed what to expect.  25mg should help, may go to 50mg if needed. Plan to go off of it in spring.  Discussed tapering dose up and down.  Discussed suicidal thinking.   Call if any problems                  No follow-ups on file.                  This document has been electronically signed by Golden Anthony MD on  December 7, 2020 17:16 CST

## 2021-02-19 ENCOUNTER — TELEPHONE (OUTPATIENT)
Dept: OBSTETRICS AND GYNECOLOGY | Facility: CLINIC | Age: 26
End: 2021-02-19

## 2021-02-19 NOTE — TELEPHONE ENCOUNTER
Pt called with complaints of left nipple tenderness for past two days. She believes infant may have bitten her or scratched nipple with his teeth.  Denies fever, chills, or feeling unwell.        Told pt to keep breast open to air as much as possible.  Apply moisturizer like coconut oil before pumping and throughout the day to nipples.  Let us know if symptoms worsen or fail to improve.

## 2021-02-24 ENCOUNTER — OFFICE VISIT (OUTPATIENT)
Dept: OBSTETRICS AND GYNECOLOGY | Facility: CLINIC | Age: 26
End: 2021-02-24

## 2021-02-24 VITALS — DIASTOLIC BLOOD PRESSURE: 62 MMHG | WEIGHT: 109.8 LBS | SYSTOLIC BLOOD PRESSURE: 108 MMHG | BODY MASS INDEX: 20.08 KG/M2

## 2021-02-24 DIAGNOSIS — O92.29 POSTPARTUM NIPPLE PAIN: Primary | ICD-10-CM

## 2021-02-24 PROBLEM — J45.909 ASTHMA COMPLICATING PREGNANCY IN THIRD TRIMESTER: Status: RESOLVED | Noted: 2019-10-22 | Resolved: 2021-02-24

## 2021-02-24 PROBLEM — Z67.91 RH NEGATIVE STATUS DURING PREGNANCY IN THIRD TRIMESTER: Status: RESOLVED | Noted: 2020-01-14 | Resolved: 2021-02-24

## 2021-02-24 PROBLEM — O26.893 RH NEGATIVE STATUS DURING PREGNANCY IN THIRD TRIMESTER: Status: RESOLVED | Noted: 2020-01-14 | Resolved: 2021-02-24

## 2021-02-24 PROBLEM — O99.513 ASTHMA COMPLICATING PREGNANCY IN THIRD TRIMESTER: Status: RESOLVED | Noted: 2019-10-22 | Resolved: 2021-02-24

## 2021-02-24 PROCEDURE — 99214 OFFICE O/P EST MOD 30 MIN: CPT | Performed by: OBSTETRICS & GYNECOLOGY

## 2021-02-24 NOTE — PROGRESS NOTES
[facility]  Gynecology  Date of Service: 2021    CC: Breast tenderness    HPI  Alisa Coffey is a 25 y.o.  premenopausal female who presents with complaints of breast tenderness while breastfeeding.  She states that it is around her left nipple.  She states she talked to Dorothy who told her to try some coconut oil to the area.  She states that this did not help much.  She has been breastfeeding for approximately 9 months and states that she wonders if her child bit her during a feed.  Denies redness, fevers, chills.  She is also worried about her supply.    ROS  Review of Systems   Constitutional: Negative.    HENT: Negative.    Eyes: Negative.    Respiratory: Negative.    Cardiovascular: Negative.    Gastrointestinal: Negative.    Endocrine: Negative.    Genitourinary: Negative.    Musculoskeletal: Negative.    Skin: Negative.    Neurological: Negative.    Hematological: Negative.    Psychiatric/Behavioral: Negative.      OB HISTORY  OB History    Para Term  AB Living   1 1 1     1   SAB TAB Ectopic Molar Multiple Live Births           0 1      # Outcome Date GA Lbr Moises/2nd Weight Sex Delivery Anes PTL Lv   1 Term 20 39w3d 10:40 / 01:09 3340 g (7 lb 5.8 oz) M Vag-Spont EPI N LYDIA      Birth Comments: NMSS normal      PAST MEDICAL HISTORY  Past Medical History:   Diagnosis Date   • Asthma    • Eczema    • GERD (gastroesophageal reflux disease)    • Supraventricular tachycardia (CMS/HCC) 2018     PAST SURGICAL HISTORY  Past Surgical History:   Procedure Laterality Date   • CHOLECYSTECTOMY  10/01/2012    Cholecystectomy, laparoscopic (with intraoperative cholangiogram. Supervision & interpretation of intraoperative cholangiogram. Chronic cholecystitis. Biliary dyskinesia. Right upper quadrant pain)   • WISDOM TOOTH EXTRACTION       FAMILY HISTORY  Family History   Problem Relation Age of Onset   • Rheum arthritis Mother    • Anxiety disorder Mother    • No  Known Problems Father    • Anxiety disorder Maternal Grandmother    • Depression Maternal Grandmother    • COPD Maternal Grandfather    • No Known Problems Paternal Grandmother    • No Known Problems Paternal Grandfather    • Diabetes type II Other      SOCIAL HISTORY  Social History     Socioeconomic History   • Marital status: Single     Spouse name: Not on file   • Number of children: Not on file   • Years of education: Not on file   • Highest education level: Not on file   Tobacco Use   • Smoking status: Passive Smoke Exposure - Never Smoker   • Smokeless tobacco: Never Used   Substance and Sexual Activity   • Alcohol use: Yes     Comment: rare and small amount   • Drug use: No   • Sexual activity: Not Currently     Partners: Male     Birth control/protection: None     ALLERGIES  Allergies   Allergen Reactions   • Hydrocodone-Acetaminophen Itching     HOME MEDICATIONS  Prior to Admission medications    Medication Sig Start Date End Date Taking? Authorizing Provider   albuterol sulfate HFA (VENTOLIN HFA) 108 (90 Base) MCG/ACT inhaler 2 puffs every 4 hours as needed for breathing 10/14/19   Campbell Moncada MD   BREO ELLIPTA 200-25 MCG/INH inhaler Inhale 1 puff Daily. 4/12/19   Campbell Moncada MD   Mupirocin (all purpose nipple ointment) Apply 1 g topically to the appropriate area as directed Every 2 (Two) Hours As Needed (nipple pain). 2/24/21   Kerline Caro MD   norethindrone (MICRONOR) 0.35 MG tablet Take 1 tablet by mouth Daily. 5/20/20 5/20/21  Tamiko Pulido MD   omeprazole (PRILOSEC) 20 MG capsule Take 1 capsule by mouth Daily. 3/18/19   Golden Anthony MD   Prenatal Vit-Fe Fumarate-FA (PRENATAL, CLASSIC, VITAMIN) 28-0.8 MG tablet tablet Take  by mouth Daily.    Provider, MD Rehan   sertraline (Zoloft) 50 MG tablet Take 0.5-1 tablets by mouth Every Night. 12/7/20   Golden Anthony MD   triamcinolone (KENALOG) 0.1 % ointment Apply  topically to the appropriate area as  directed 2 (Two) Times a Day. 20   Nelsy Goel APRN   Mupirocin (all purpose nipple ointment) Apply 1 g topically to the appropriate area as directed Every 2 (Two) Hours As Needed (nipple pain). 21  Kerline Caro MD     PE  /62 (BP Location: Left arm, Patient Position: Sitting, Cuff Size: Adult)   Wt 49.8 kg (109 lb 12.8 oz)   Breastfeeding Yes   BMI 20.08 kg/m²        General: Alert, healthy, no distress, well nourished and well developed.  Neurologic: Alert, oriented to person, place, and time.  Gait normal.  Cranial nerves II-XII grossly intact.  HEENT: Normocephalic, atraumatic.  Extraocular muscles intact, pupils equal and reactive times two.    Neck: Supple, no adenopathy, thyroid normal size, non-tender, without nodularity, trachea midline.  Breasts: No masses, skin dimpling, skin retraction, nipple discharge, or asymmetry bilaterally.  L nipple with irritated area midline, no erythema or induration.  R nipple WNL.  Lungs: Normal respiratory effort.  Clear to auscultation bilaterally.  No wheezes, rhonci, or rales.  Heart: Regular rate and rhythm.  No murmer, rub or gallop.  Abdomen: Soft, non-tender, non-distended,no masses, no hepatosplenomegaly, no hernia.  Skin: No rash, no lesions.  Extremities: No cyanosis, clubbing or edema.    IMPRESSION  Alisa Coffey is a 25 y.o.  presenting with nipple pain, cracked/sore nipple.    PLAN    1. Postpartum nipple pain  Advised APNO cream.  Discussed warm compresses to nipple, epsom salts.  Lactation consult info given for Carli; advised patient to call to set up appt.  - Mupirocin (all purpose nipple ointment); Apply 1 g topically to the appropriate area as directed Every 2 (Two) Hours As Needed (nipple pain).  Dispense: 40 g; Refill: 3    This document has been electronically signed by Kerline Caro MD on 2021 15:55 CST.

## 2021-02-25 ENCOUNTER — TELEPHONE (OUTPATIENT)
Dept: LACTATION | Facility: HOSPITAL | Age: 26
End: 2021-02-25

## 2021-02-25 NOTE — TELEPHONE ENCOUNTER
Mother called with a few questions about supply. We talked through some natural options that will boost her supply. We also discussed how to nurse a baby with teeth. All questions answered. No further questions or concerns at this time.

## 2021-05-24 RX ORDER — ACETAMINOPHEN AND CODEINE PHOSPHATE 120; 12 MG/5ML; MG/5ML
1 SOLUTION ORAL DAILY
Qty: 28 TABLET | Refills: 12 | Status: SHIPPED | OUTPATIENT
Start: 2021-05-24 | End: 2022-03-02 | Stop reason: ALTCHOICE

## 2021-10-12 RX ORDER — ALBUTEROL SULFATE 90 UG/1
AEROSOL, METERED RESPIRATORY (INHALATION)
OUTPATIENT
Start: 2021-10-12

## 2021-10-24 RX ORDER — ALBUTEROL SULFATE 90 UG/1
AEROSOL, METERED RESPIRATORY (INHALATION)
Status: CANCELLED | OUTPATIENT
Start: 2021-10-24

## 2021-10-25 RX ORDER — ALBUTEROL SULFATE 90 UG/1
AEROSOL, METERED RESPIRATORY (INHALATION)
Qty: 6.7 G | Refills: 0 | OUTPATIENT
Start: 2021-10-25

## 2022-03-02 ENCOUNTER — OFFICE VISIT (OUTPATIENT)
Dept: FAMILY MEDICINE CLINIC | Facility: CLINIC | Age: 27
End: 2022-03-02

## 2022-03-02 VITALS
HEART RATE: 89 BPM | TEMPERATURE: 98.6 F | WEIGHT: 113.5 LBS | DIASTOLIC BLOOD PRESSURE: 67 MMHG | SYSTOLIC BLOOD PRESSURE: 101 MMHG | BODY MASS INDEX: 20.89 KG/M2 | HEIGHT: 62 IN | OXYGEN SATURATION: 100 %

## 2022-03-02 DIAGNOSIS — J30.2 SEASONAL ALLERGIC RHINITIS, UNSPECIFIED TRIGGER: Primary | ICD-10-CM

## 2022-03-02 DIAGNOSIS — Z30.09 BIRTH CONTROL COUNSELING: ICD-10-CM

## 2022-03-02 DIAGNOSIS — J45.20 MILD INTERMITTENT ASTHMA WITHOUT COMPLICATION: ICD-10-CM

## 2022-03-02 PROCEDURE — 99214 OFFICE O/P EST MOD 30 MIN: CPT | Performed by: FAMILY MEDICINE

## 2022-03-02 RX ORDER — NORGESTIMATE AND ETHINYL ESTRADIOL 7DAYSX3 28
1 KIT ORAL DAILY
Qty: 28 TABLET | Refills: 5 | Status: SHIPPED | OUTPATIENT
Start: 2022-03-02 | End: 2022-12-21

## 2022-03-02 RX ORDER — CETIRIZINE HYDROCHLORIDE 10 MG/1
10 TABLET ORAL DAILY
Qty: 90 TABLET | Refills: 3 | Status: SHIPPED | OUTPATIENT
Start: 2022-03-02

## 2022-03-02 RX ORDER — ALBUTEROL SULFATE 90 UG/1
AEROSOL, METERED RESPIRATORY (INHALATION)
Qty: 18 G | Refills: 11 | Status: SHIPPED | OUTPATIENT
Start: 2022-03-02 | End: 2023-03-29

## 2022-03-02 RX ORDER — FLUTICASONE PROPIONATE 50 MCG
2 SPRAY, SUSPENSION (ML) NASAL DAILY
Qty: 16 G | Refills: 11 | Status: SHIPPED | OUTPATIENT
Start: 2022-03-02

## 2022-03-02 NOTE — PROGRESS NOTES
" Subjective   Alisa Coffey is a 26 y.o. female.     Chief Complaint   Patient presents with   • Earache             History of Present Illness     [place] bad Sunday. Sinus and cough.  She has asthma.   She took 2 home covid tests, neg.   She is no longer breast feeding.  She is taking micronor.     Review of Systems   Constitutional: Negative for chills and fever.   HENT: Positive for ear pain and sore throat. Negative for postnasal drip and rhinorrhea.    Respiratory: Positive for cough and wheezing. Negative for shortness of breath.    Cardiovascular: Negative for chest pain.   Musculoskeletal: Negative for myalgias.   Skin: Negative for rash.   Neurological: Positive for headaches.           /67 (BP Location: Left arm, Patient Position: Sitting, Cuff Size: Adult)   Pulse 89   Temp 98.6 °F (37 °C) (Infrared)   Ht 157.5 cm (62.01\")   Wt 51.5 kg (113 lb 8 oz)   SpO2 100%   Breastfeeding No   BMI 20.75 kg/m²       Objective     Physical Exam  Vitals and nursing note reviewed.   Constitutional:       Appearance: Normal appearance. She is well-developed.   HENT:      Head: Normocephalic and atraumatic.      Right Ear: External ear normal.      Left Ear: External ear normal.      Nose: Nose normal. No rhinorrhea.      Mouth/Throat:      Mouth: Mucous membranes are moist.      Comments: Lymphoid hyperplasia  Eyes:      General: No scleral icterus.     Extraocular Movements: Extraocular movements intact.      Conjunctiva/sclera: Conjunctivae normal.      Pupils: Pupils are equal, round, and reactive to light.   Cardiovascular:      Rate and Rhythm: Normal rate and regular rhythm.      Heart sounds: Normal heart sounds. No friction rub. No gallop.    Pulmonary:      Effort: Pulmonary effort is normal.      Breath sounds: Normal breath sounds.   Abdominal:      General: Bowel sounds are normal. There is no distension.      Palpations: Abdomen is soft.      Tenderness: There is no abdominal tenderness. "   Musculoskeletal:         General: No deformity. Normal range of motion.      Cervical back: Normal range of motion and neck supple.   Skin:     General: Skin is warm and dry.      Findings: No erythema or rash.   Neurological:      Mental Status: She is alert and oriented to person, place, and time.      Cranial Nerves: No cranial nerve deficit.   Psychiatric:         Behavior: Behavior normal.         Thought Content: Thought content normal.         Judgment: Judgment normal.             PAST MEDICAL HISTORY     Past Medical History:   Diagnosis Date   • Asthma    • Eczema    • GERD (gastroesophageal reflux disease)    • Supraventricular tachycardia (HCC) 4/13/2018      PAST SURGICAL HISTORY     Past Surgical History:   Procedure Laterality Date   • CHOLECYSTECTOMY  10/01/2012    Cholecystectomy, laparoscopic (with intraoperative cholangiogram. Supervision & interpretation of intraoperative cholangiogram. Chronic cholecystitis. Biliary dyskinesia. Right upper quadrant pain)   • WISDOM TOOTH EXTRACTION        SOCIAL HISTORY     Social History     Socioeconomic History   • Marital status: Single   Tobacco Use   • Smoking status: Passive Smoke Exposure - Never Smoker   • Smokeless tobacco: Never Used   Substance and Sexual Activity   • Alcohol use: Yes     Comment: rare and small amount   • Drug use: No   • Sexual activity: Not Currently     Partners: Male     Birth control/protection: None      ALLERGIES   Hydrocodone-acetaminophen   MEDICATIONS     Current Outpatient Medications   Medication Sig Dispense Refill   • albuterol sulfate HFA (Ventolin HFA) 108 (90 Base) MCG/ACT inhaler 2 puffs every 4 hours as needed for breathing 18 g 11   • cetirizine (zyrTEC) 10 MG tablet Take 1 tablet by mouth Daily. 90 tablet 3   • Prenatal Vit-Fe Fumarate-FA (PRENATAL, CLASSIC, VITAMIN) 28-0.8 MG tablet tablet Take  by mouth Daily.     • sertraline (ZOLOFT) 50 MG tablet TAKE 1/2 TO 1 TABLET BY MOUTH EVERY NIGHT 90 tablet 3   •  Breo Ellipta 200-25 MCG/INH inhaler Inhale 1 puff Daily. 1 each 5   • fluticasone (Flonase) 50 MCG/ACT nasal spray 2 sprays into the nostril(s) as directed by provider Daily. 16 g 11   • norgestimate-ethinyl estradiol (Tri-Sprintec) 0.18/0.215/0.25 MG-35 MCG per tablet Take 1 tablet by mouth Daily. 28 tablet 5   • omeprazole (PRILOSEC) 20 MG capsule Take 1 capsule by mouth Daily. 90 capsule 3   • triamcinolone (KENALOG) 0.1 % ointment Apply  topically to the appropriate area as directed 2 (Two) Times a Day. 80 g 0     No current facility-administered medications for this visit.        The following portions of the patient's history were reviewed and updated as appropriate: allergies, current medications, past family history, past medical history, past social history, past surgical history and problem list.        Assessment/Plan   Diagnoses and all orders for this visit:    1. Seasonal allergic rhinitis, unspecified trigger (Primary)    2. Mild intermittent asthma without complication    3. Birth control counseling    Other orders  -     Breo Ellipta 200-25 MCG/INH inhaler; Inhale 1 puff Daily.  Dispense: 1 each; Refill: 5  -     albuterol sulfate HFA (Ventolin HFA) 108 (90 Base) MCG/ACT inhaler; 2 puffs every 4 hours as needed for breathing  Dispense: 18 g; Refill: 11  -     cetirizine (zyrTEC) 10 MG tablet; Take 1 tablet by mouth Daily.  Dispense: 90 tablet; Refill: 3  -     fluticasone (Flonase) 50 MCG/ACT nasal spray; 2 sprays into the nostril(s) as directed by provider Daily.  Dispense: 16 g; Refill: 11  -     norgestimate-ethinyl estradiol (Tri-Sprintec) 0.18/0.215/0.25 MG-35 MCG per tablet; Take 1 tablet by mouth Daily.  Dispense: 28 tablet; Refill: 5      Cough is  Her sign that her asthma is acting up.  Restart breo and albuterol prn.   Zyrtec and flonase for allergies.   Explained micronor is not good birth control, etc.  Restart her previous birth control.  She will follow up with her ob/gyn.                  No follow-ups on file.                  This document has been electronically signed by Golden Anthony MD on March 2, 2022 11:56 CST     Answers for HPI/ROS submitted by the patient on 3/2/2022  What is the primary reason for your visit?: Cough

## 2022-03-08 ENCOUNTER — TELEPHONE (OUTPATIENT)
Dept: FAMILY MEDICINE CLINIC | Facility: CLINIC | Age: 27
End: 2022-03-08

## 2022-03-08 RX ORDER — BUDESONIDE AND FORMOTEROL FUMARATE DIHYDRATE 80; 4.5 UG/1; UG/1
2 AEROSOL RESPIRATORY (INHALATION)
Qty: 1 EACH | Refills: 12 | Status: SHIPPED | OUTPATIENT
Start: 2022-03-08

## 2022-12-21 RX ORDER — NORGESTIMATE AND ETHINYL ESTRADIOL 7DAYSX3 28
1 KIT ORAL DAILY
Qty: 28 TABLET | Refills: 11 | Status: SHIPPED | OUTPATIENT
Start: 2022-12-21 | End: 2023-12-21